# Patient Record
Sex: FEMALE | Race: BLACK OR AFRICAN AMERICAN | NOT HISPANIC OR LATINO | Employment: OTHER | URBAN - METROPOLITAN AREA
[De-identification: names, ages, dates, MRNs, and addresses within clinical notes are randomized per-mention and may not be internally consistent; named-entity substitution may affect disease eponyms.]

---

## 2023-05-01 DIAGNOSIS — I10 HYPERTENSION, UNSPECIFIED TYPE: ICD-10-CM

## 2023-05-01 RX ORDER — AMLODIPINE BESYLATE 5 MG/1
1 TABLET ORAL DAILY
COMMUNITY
Start: 2022-04-16 | End: 2023-05-01 | Stop reason: SDUPTHER

## 2023-05-01 RX ORDER — AMLODIPINE BESYLATE 5 MG/1
5 TABLET ORAL DAILY
Qty: 90 TABLET | Refills: 0 | Status: SHIPPED | OUTPATIENT
Start: 2023-05-01 | End: 2023-08-31 | Stop reason: SDUPTHER

## 2023-07-10 ENCOUNTER — OFFICE VISIT (OUTPATIENT)
Dept: PRIMARY CARE | Facility: CLINIC | Age: 73
End: 2023-07-10
Payer: COMMERCIAL

## 2023-07-10 ENCOUNTER — LAB (OUTPATIENT)
Dept: LAB | Facility: LAB | Age: 73
End: 2023-07-10
Payer: COMMERCIAL

## 2023-07-10 VITALS
HEART RATE: 77 BPM | OXYGEN SATURATION: 96 % | SYSTOLIC BLOOD PRESSURE: 123 MMHG | WEIGHT: 206 LBS | DIASTOLIC BLOOD PRESSURE: 86 MMHG | BODY MASS INDEX: 31.32 KG/M2

## 2023-07-10 DIAGNOSIS — G47.33 OBSTRUCTIVE SLEEP APNEA OF ADULT: ICD-10-CM

## 2023-07-10 DIAGNOSIS — Z11.59 NEED FOR HEPATITIS C SCREENING TEST: ICD-10-CM

## 2023-07-10 DIAGNOSIS — E78.5 DYSLIPIDEMIA: ICD-10-CM

## 2023-07-10 DIAGNOSIS — R53.83 FATIGUE, UNSPECIFIED TYPE: ICD-10-CM

## 2023-07-10 DIAGNOSIS — M17.0 PRIMARY OSTEOARTHRITIS OF BOTH KNEES: ICD-10-CM

## 2023-07-10 DIAGNOSIS — I10 PRIMARY HYPERTENSION: Primary | ICD-10-CM

## 2023-07-10 DIAGNOSIS — R73.9 HYPERGLYCEMIA: ICD-10-CM

## 2023-07-10 DIAGNOSIS — Z12.31 ENCOUNTER FOR SCREENING MAMMOGRAM FOR BREAST CANCER: ICD-10-CM

## 2023-07-10 DIAGNOSIS — E55.9 VITAMIN D DEFICIENCY: ICD-10-CM

## 2023-07-10 DIAGNOSIS — I10 PRIMARY HYPERTENSION: ICD-10-CM

## 2023-07-10 PROBLEM — J06.9 ACUTE URI: Status: RESOLVED | Noted: 2023-07-10 | Resolved: 2023-07-10

## 2023-07-10 PROBLEM — R09.81 NASAL CONGESTION: Status: RESOLVED | Noted: 2023-07-10 | Resolved: 2023-07-10

## 2023-07-10 PROBLEM — D22.9 BENIGN MOLE: Status: ACTIVE | Noted: 2023-07-10

## 2023-07-10 PROBLEM — R79.9 ABNORMAL BLOOD CHEMISTRY: Status: ACTIVE | Noted: 2023-07-10

## 2023-07-10 PROBLEM — R05.9 COUGH: Status: ACTIVE | Noted: 2023-07-10

## 2023-07-10 PROBLEM — M25.551 RIGHT HIP PAIN: Status: ACTIVE | Noted: 2023-07-10

## 2023-07-10 PROBLEM — Z20.822 EXPOSURE TO COVID-19 VIRUS: Status: RESOLVED | Noted: 2023-07-10 | Resolved: 2023-07-10

## 2023-07-10 PROBLEM — R93.89 ABNORMAL CHEST XRAY: Status: ACTIVE | Noted: 2023-07-10

## 2023-07-10 PROBLEM — K21.9 ACID REFLUX: Status: ACTIVE | Noted: 2023-07-10

## 2023-07-10 PROBLEM — M25.561 BILATERAL KNEE PAIN: Status: ACTIVE | Noted: 2023-07-10

## 2023-07-10 PROBLEM — N95.0 POSTMENOPAUSAL BLEEDING: Status: ACTIVE | Noted: 2023-07-10

## 2023-07-10 PROBLEM — R29.818 SUSPECTED SLEEP APNEA: Status: RESOLVED | Noted: 2023-07-10 | Resolved: 2023-07-10

## 2023-07-10 PROBLEM — M79.673 FOOT PAIN: Status: ACTIVE | Noted: 2023-07-10

## 2023-07-10 PROBLEM — I51.7 LVH (LEFT VENTRICULAR HYPERTROPHY): Status: ACTIVE | Noted: 2023-07-10

## 2023-07-10 PROBLEM — R79.89 ABNORMAL TSH: Status: ACTIVE | Noted: 2023-07-10

## 2023-07-10 PROBLEM — N95.1 POSTMENOPAUSAL DISORDER: Status: ACTIVE | Noted: 2023-07-10

## 2023-07-10 PROBLEM — L98.9 SKIN LESION: Status: ACTIVE | Noted: 2023-07-10

## 2023-07-10 PROBLEM — R10.9 ABDOMINAL DISCOMFORT: Status: ACTIVE | Noted: 2023-07-10

## 2023-07-10 PROBLEM — M79.89 LEFT LEG SWELLING: Status: ACTIVE | Noted: 2023-07-10

## 2023-07-10 PROBLEM — G47.10 HYPERSOMNIA: Status: ACTIVE | Noted: 2023-07-10

## 2023-07-10 PROBLEM — M25.551 GREATER TROCHANTERIC PAIN SYNDROME OF RIGHT LOWER EXTREMITY: Status: ACTIVE | Noted: 2023-07-10

## 2023-07-10 PROBLEM — M79.661 RIGHT CALF PAIN: Status: ACTIVE | Noted: 2023-07-10

## 2023-07-10 PROBLEM — R23.2 HOT FLASHES: Status: ACTIVE | Noted: 2023-07-10

## 2023-07-10 PROBLEM — R74.8 ABNORMAL LIVER ENZYMES: Status: ACTIVE | Noted: 2023-07-10

## 2023-07-10 PROBLEM — D49.7 PITUITARY TUMOR: Status: ACTIVE | Noted: 2023-07-10

## 2023-07-10 PROBLEM — E66.01 MORBID OBESITY (MULTI): Status: ACTIVE | Noted: 2023-07-10

## 2023-07-10 PROBLEM — R25.2 LEG CRAMPS: Status: ACTIVE | Noted: 2023-07-10

## 2023-07-10 PROBLEM — I42.9 CARDIOMYOPATHY (MULTI): Status: ACTIVE | Noted: 2023-07-10

## 2023-07-10 PROBLEM — R06.83 SNORING: Status: ACTIVE | Noted: 2023-07-10

## 2023-07-10 PROBLEM — M76.31 IT BAND SYNDROME, RIGHT: Status: ACTIVE | Noted: 2023-07-10

## 2023-07-10 PROBLEM — H53.9 VISION CHANGES: Status: ACTIVE | Noted: 2023-07-10

## 2023-07-10 PROBLEM — R07.89 ATYPICAL CHEST PAIN: Status: ACTIVE | Noted: 2023-07-10

## 2023-07-10 PROBLEM — M62.838 MUSCLE SPASM: Status: ACTIVE | Noted: 2023-07-10

## 2023-07-10 PROBLEM — R35.0 URINARY FREQUENCY: Status: RESOLVED | Noted: 2023-07-10 | Resolved: 2023-07-10

## 2023-07-10 PROBLEM — H91.90 CHANGE IN HEARING: Status: ACTIVE | Noted: 2023-07-10

## 2023-07-10 PROBLEM — M54.50 RIGHT LOW BACK PAIN: Status: ACTIVE | Noted: 2023-07-10

## 2023-07-10 PROBLEM — R49.0 HOARSENESS OF VOICE: Status: ACTIVE | Noted: 2023-07-10

## 2023-07-10 PROBLEM — N39.0 UTI (URINARY TRACT INFECTION): Status: RESOLVED | Noted: 2023-07-10 | Resolved: 2023-07-10

## 2023-07-10 PROBLEM — M17.11 OSTEOARTHRITIS OF RIGHT KNEE: Status: ACTIVE | Noted: 2023-07-10

## 2023-07-10 PROBLEM — M47.816 DJD (DEGENERATIVE JOINT DISEASE), LUMBAR: Status: ACTIVE | Noted: 2023-07-10

## 2023-07-10 PROBLEM — R00.2 HEART PALPITATIONS: Status: ACTIVE | Noted: 2023-07-10

## 2023-07-10 PROBLEM — I35.9 AORTIC VALVE DISORDER: Status: ACTIVE | Noted: 2023-07-10

## 2023-07-10 PROBLEM — J02.9 SORE THROAT: Status: RESOLVED | Noted: 2023-07-10 | Resolved: 2023-07-10

## 2023-07-10 PROBLEM — D21.9 FIBROIDS: Status: ACTIVE | Noted: 2023-07-10

## 2023-07-10 PROBLEM — M25.562 BILATERAL KNEE PAIN: Status: ACTIVE | Noted: 2023-07-10

## 2023-07-10 PROBLEM — M67.951 TENDINOPATHY OF RIGHT GLUTEUS MEDIUS: Status: ACTIVE | Noted: 2023-07-10

## 2023-07-10 PROBLEM — D22.9 SKIN MOLE: Status: ACTIVE | Noted: 2023-07-10

## 2023-07-10 LAB
ALANINE AMINOTRANSFERASE (SGPT) (U/L) IN SER/PLAS: 24 U/L (ref 7–45)
ALBUMIN (G/DL) IN SER/PLAS: 4.6 G/DL (ref 3.4–5)
ALKALINE PHOSPHATASE (U/L) IN SER/PLAS: 83 U/L (ref 33–136)
ANION GAP IN SER/PLAS: 12 MMOL/L (ref 10–20)
ASPARTATE AMINOTRANSFERASE (SGOT) (U/L) IN SER/PLAS: 25 U/L (ref 9–39)
BASOPHILS (10*3/UL) IN BLOOD BY AUTOMATED COUNT: 0.04 X10E9/L (ref 0–0.1)
BASOPHILS/100 LEUKOCYTES IN BLOOD BY AUTOMATED COUNT: 0.5 % (ref 0–2)
BILIRUBIN TOTAL (MG/DL) IN SER/PLAS: 0.6 MG/DL (ref 0–1.2)
CALCIDIOL (25 OH VITAMIN D3) (NG/ML) IN SER/PLAS: 25 NG/ML
CALCIUM (MG/DL) IN SER/PLAS: 10.1 MG/DL (ref 8.6–10.6)
CARBON DIOXIDE, TOTAL (MMOL/L) IN SER/PLAS: 29 MMOL/L (ref 21–32)
CHLORIDE (MMOL/L) IN SER/PLAS: 105 MMOL/L (ref 98–107)
CHOLESTEROL (MG/DL) IN SER/PLAS: 196 MG/DL (ref 0–199)
CHOLESTEROL IN HDL (MG/DL) IN SER/PLAS: 66.6 MG/DL
CHOLESTEROL/HDL RATIO: 2.9
COBALAMIN (VITAMIN B12) (PG/ML) IN SER/PLAS: 1501 PG/ML (ref 211–911)
CREATININE (MG/DL) IN SER/PLAS: 1.03 MG/DL (ref 0.5–1.05)
EOSINOPHILS (10*3/UL) IN BLOOD BY AUTOMATED COUNT: 0.23 X10E9/L (ref 0–0.4)
EOSINOPHILS/100 LEUKOCYTES IN BLOOD BY AUTOMATED COUNT: 2.6 % (ref 0–6)
ERYTHROCYTE DISTRIBUTION WIDTH (RATIO) BY AUTOMATED COUNT: 15 % (ref 11.5–14.5)
ERYTHROCYTE MEAN CORPUSCULAR HEMOGLOBIN CONCENTRATION (G/DL) BY AUTOMATED: 32 G/DL (ref 32–36)
ERYTHROCYTE MEAN CORPUSCULAR VOLUME (FL) BY AUTOMATED COUNT: 91 FL (ref 80–100)
ERYTHROCYTES (10*6/UL) IN BLOOD BY AUTOMATED COUNT: 4.78 X10E12/L (ref 4–5.2)
ESTIMATED AVERAGE GLUCOSE FOR HBA1C: 111 MG/DL
GFR FEMALE: 57 ML/MIN/1.73M2
GLUCOSE (MG/DL) IN SER/PLAS: 94 MG/DL (ref 74–99)
HEMATOCRIT (%) IN BLOOD BY AUTOMATED COUNT: 43.4 % (ref 36–46)
HEMOGLOBIN (G/DL) IN BLOOD: 13.9 G/DL (ref 12–16)
HEMOGLOBIN A1C/HEMOGLOBIN TOTAL IN BLOOD: 5.5 %
IMMATURE GRANULOCYTES/100 LEUKOCYTES IN BLOOD BY AUTOMATED COUNT: 0.2 % (ref 0–0.9)
LDL: 106 MG/DL (ref 0–99)
LEUKOCYTES (10*3/UL) IN BLOOD BY AUTOMATED COUNT: 8.8 X10E9/L (ref 4.4–11.3)
LYMPHOCYTES (10*3/UL) IN BLOOD BY AUTOMATED COUNT: 2.51 X10E9/L (ref 0.8–3)
LYMPHOCYTES/100 LEUKOCYTES IN BLOOD BY AUTOMATED COUNT: 28.7 % (ref 13–44)
MONOCYTES (10*3/UL) IN BLOOD BY AUTOMATED COUNT: 0.71 X10E9/L (ref 0.05–0.8)
MONOCYTES/100 LEUKOCYTES IN BLOOD BY AUTOMATED COUNT: 8.1 % (ref 2–10)
NEUTROPHILS (10*3/UL) IN BLOOD BY AUTOMATED COUNT: 5.24 X10E9/L (ref 1.6–5.5)
NEUTROPHILS/100 LEUKOCYTES IN BLOOD BY AUTOMATED COUNT: 59.9 % (ref 40–80)
NRBC (PER 100 WBCS) BY AUTOMATED COUNT: 0 /100 WBC (ref 0–0)
PLATELETS (10*3/UL) IN BLOOD AUTOMATED COUNT: 258 X10E9/L (ref 150–450)
POTASSIUM (MMOL/L) IN SER/PLAS: 4.5 MMOL/L (ref 3.5–5.3)
PROTEIN TOTAL: 7.6 G/DL (ref 6.4–8.2)
SODIUM (MMOL/L) IN SER/PLAS: 141 MMOL/L (ref 136–145)
THYROTROPIN (MIU/L) IN SER/PLAS BY DETECTION LIMIT <= 0.05 MIU/L: 0.75 MIU/L (ref 0.44–3.98)
TRIGLYCERIDE (MG/DL) IN SER/PLAS: 115 MG/DL (ref 0–149)
UREA NITROGEN (MG/DL) IN SER/PLAS: 17 MG/DL (ref 6–23)
VLDL: 23 MG/DL (ref 0–40)

## 2023-07-10 PROCEDURE — 1126F AMNT PAIN NOTED NONE PRSNT: CPT | Performed by: PHYSICIAN ASSISTANT

## 2023-07-10 PROCEDURE — 82306 VITAMIN D 25 HYDROXY: CPT

## 2023-07-10 PROCEDURE — 83036 HEMOGLOBIN GLYCOSYLATED A1C: CPT

## 2023-07-10 PROCEDURE — 99213 OFFICE O/P EST LOW 20 MIN: CPT | Performed by: PHYSICIAN ASSISTANT

## 2023-07-10 PROCEDURE — 85025 COMPLETE CBC W/AUTO DIFF WBC: CPT

## 2023-07-10 PROCEDURE — 1036F TOBACCO NON-USER: CPT | Performed by: PHYSICIAN ASSISTANT

## 2023-07-10 PROCEDURE — 3079F DIAST BP 80-89 MM HG: CPT | Performed by: PHYSICIAN ASSISTANT

## 2023-07-10 PROCEDURE — 86803 HEPATITIS C AB TEST: CPT

## 2023-07-10 PROCEDURE — 3074F SYST BP LT 130 MM HG: CPT | Performed by: PHYSICIAN ASSISTANT

## 2023-07-10 PROCEDURE — 82607 VITAMIN B-12: CPT

## 2023-07-10 PROCEDURE — 1159F MED LIST DOCD IN RCRD: CPT | Performed by: PHYSICIAN ASSISTANT

## 2023-07-10 PROCEDURE — 84443 ASSAY THYROID STIM HORMONE: CPT

## 2023-07-10 PROCEDURE — 36415 COLL VENOUS BLD VENIPUNCTURE: CPT

## 2023-07-10 PROCEDURE — 80061 LIPID PANEL: CPT

## 2023-07-10 PROCEDURE — 80053 COMPREHEN METABOLIC PANEL: CPT

## 2023-07-10 PROCEDURE — 1160F RVW MEDS BY RX/DR IN RCRD: CPT | Performed by: PHYSICIAN ASSISTANT

## 2023-07-10 RX ORDER — AMLODIPINE BESYLATE 5 MG/1
1 TABLET ORAL DAILY
COMMUNITY
Start: 2022-04-16 | End: 2023-07-13 | Stop reason: ALTCHOICE

## 2023-07-10 RX ORDER — NAPROXEN SODIUM 220 MG/1
1 TABLET, FILM COATED ORAL DAILY
COMMUNITY
Start: 2016-08-17

## 2023-07-10 RX ORDER — METOPROLOL SUCCINATE 100 MG/1
1 TABLET, EXTENDED RELEASE ORAL DAILY
COMMUNITY
Start: 2013-10-29 | End: 2023-10-23 | Stop reason: SDUPTHER

## 2023-07-10 RX ORDER — LISINOPRIL 40 MG/1
1 TABLET ORAL DAILY
COMMUNITY
Start: 2013-10-29 | End: 2023-07-10 | Stop reason: SDUPTHER

## 2023-07-10 RX ORDER — MELOXICAM 15 MG/1
TABLET ORAL
COMMUNITY
Start: 2023-06-26 | End: 2023-11-30

## 2023-07-10 RX ORDER — TRIAMCINOLONE ACETONIDE 32 MG
SUSPENSION,EXTENDED RELEASE VIAL (EA) INTRAARTICULAR
COMMUNITY
Start: 2023-01-17

## 2023-07-10 RX ORDER — ROSUVASTATIN CALCIUM 5 MG/1
TABLET, COATED ORAL
COMMUNITY
Start: 2020-09-15 | End: 2024-01-15 | Stop reason: SDUPTHER

## 2023-07-10 RX ORDER — LISINOPRIL 40 MG/1
40 TABLET ORAL DAILY
Qty: 90 TABLET | Refills: 1 | Status: SHIPPED | OUTPATIENT
Start: 2023-07-10 | End: 2024-01-15 | Stop reason: SDUPTHER

## 2023-07-10 RX ORDER — DEXTROMETHORPHAN HYDROBROMIDE, GUAIFENESIN 5; 100 MG/5ML; MG/5ML
LIQUID ORAL
COMMUNITY
Start: 2022-12-09

## 2023-07-10 RX ORDER — MULTIVITAMIN
1 TABLET ORAL DAILY
COMMUNITY
Start: 2020-09-15

## 2023-07-10 NOTE — ASSESSMENT & PLAN NOTE
Acceptable. Continue amlodipine 5mg, lisinopril 40mg, metoprolol succinate 100mg. Follow a strict DASH diet and exercise as tolerated.

## 2023-07-10 NOTE — PROGRESS NOTES
Subjective   Constance Crump is a 73 y.o. female who presents for 6 month f/u, fatigue .  HPI Constance Crump is a 73 y.o. female presenting for a follow up. Generally doing well. Currently c/o:    Fatigue, chronic: at her last appt she was referred to sleep medicine for WINDY as she has not been using her CPAP machine. Unfortunately she has not scheduled yet, needs a new referral. She continues to take a vitamin B12 supplement     HTN, HLD: stable on current regimen. She does check BP at home.  Denies any headache, dizziness, chest pain, SOB/KIM, palpitations, LE edema.    OA of bilateral knees: follows with orthopedics. S/p bilateral cortisone injection and completion of PT. Takes Meloxicam for pain.     Health maintenance:  Immunizations:  -Flu: deferred to fall 2023  -Pneumococcal: UTD  -Shingrix: recommended, obtain from pharmacy   -Tdap: recommended from pharmacy  Mammogram UTD (last 5/17/22) - ordered 7/10/23  Colon CA screening UTD (last 11/15/22) - 5yrs   DEXA UTD (last 5/17/22)  PAP - not indicated d/t age    Last MCR/CPE: 1/9/23 (MCR ADV)    12 point ROS reviewed and negative other than as stated in HPI    /86   Pulse 77   Wt 93.4 kg (206 lb)   SpO2 96%   BMI 31.32 kg/m²   Objective   Physical Exam  Vitals reviewed.   Constitutional:       General: She is not in acute distress.     Appearance: Normal appearance. She is not ill-appearing.   HENT:      Head: Normocephalic and atraumatic.   Eyes:      General: No scleral icterus.     Extraocular Movements: Extraocular movements intact.      Conjunctiva/sclera: Conjunctivae normal.      Pupils: Pupils are equal, round, and reactive to light.   Cardiovascular:      Rate and Rhythm: Normal rate and regular rhythm.      Heart sounds: Normal heart sounds. No murmur heard.     No friction rub. No gallop.   Pulmonary:      Effort: Pulmonary effort is normal. No respiratory distress.      Breath sounds: Normal breath sounds. No stridor. No wheezing,  rhonchi or rales.   Musculoskeletal:      Cervical back: Normal range of motion.      Right lower leg: No edema.      Left lower leg: No edema.   Skin:     General: Skin is warm and dry.   Neurological:      Mental Status: She is alert and oriented to person, place, and time. Mental status is at baseline.      Cranial Nerves: No cranial nerve deficit.      Gait: Gait normal.   Psychiatric:         Mood and Affect: Mood normal.         Behavior: Behavior normal.         Assessment/Plan   Problem List Items Addressed This Visit       Dyslipidemia     Stable.  Continue 81 mg ASA and rosuvastatin 5 mg.  Follow Mediterranean-style diet and exercise as tolerated.  Lipid panel ordered.         Relevant Orders    Lipid Panel (Completed)    Fatigue     Labs including CBC with differential, TSH with reflex free T4, and vitamin B12 ordered.  Encourage multivitamin daily, regular exercise, well-rounded diet, and adequate water intake.    Likely uncontrolled WINDY contributing.         Relevant Orders    CBC and Auto Differential (Completed)    TSH with reflex to Free T4 if abnormal (Completed)    Vitamin B12 (Completed)    Hyperglycemia     Hemoglobin A1c ordered.  Cut back on carbohydrates and sugary drinks/foods in the diet.         Relevant Orders    Hemoglobin A1C (Completed)    Obstructive sleep apnea of adult     Referral to sleep medicine placed.  Encourage CPAP use and weight loss         Relevant Orders    Referral to Adult Sleep Medicine    Primary osteoarthritis of both knees     Continue following with orthopedics.  Take meloxicam for pain.         Vitamin D deficiency     Vitamin D level ordered.         Relevant Orders    Vitamin D, Total (Completed)    Need for hepatitis C screening test     One-time Hepatitis C level ordered for screening         Relevant Orders    Hepatitis C antibody (Completed)    Hypertension - Primary     Acceptable. Continue amlodipine 5mg, lisinopril 40mg, metoprolol succinate 100mg. Follow  a strict DASH diet and exercise as tolerated.          Relevant Medications    lisinopril 40 mg tablet    Other Relevant Orders    Comprehensive Metabolic Panel (Completed)     Other Visit Diagnoses       Encounter for screening mammogram for breast cancer        Relevant Orders    BI mammo bilateral screening tomosynthesis           Follow up in 6 months for MCR wellness or sooner as needed

## 2023-07-11 LAB — HEPATITIS C VIRUS AB PRESENCE IN SERUM: NONREACTIVE

## 2023-07-13 PROBLEM — Z11.59 NEED FOR HEPATITIS C SCREENING TEST: Status: ACTIVE | Noted: 2023-07-13

## 2023-07-13 NOTE — RESULT ENCOUNTER NOTE
Lab results are back.    Vitamin B12 level was very high.  If taking any supplementation, I recommend cutting back.  Vitamin D level was low.  Take over-the-counter vitamin D3 2000 units daily with food.    Kidney function remains slightly decreased but stable from prior draws.  Drink adequate water, limit any NSAIDs.  We will continue to monitor    Lipid panel shows slightly elevated LDL (bad cholesterol).  Continue rosuvastatin 5 mg.  Work on a heart healthy lifestyle

## 2023-07-13 NOTE — ASSESSMENT & PLAN NOTE
Stable.  Continue 81 mg ASA and rosuvastatin 5 mg.  Follow Mediterranean-style diet and exercise as tolerated.  Lipid panel ordered.

## 2023-07-13 NOTE — ASSESSMENT & PLAN NOTE
Labs including CBC with differential, TSH with reflex free T4, and vitamin B12 ordered.  Encourage multivitamin daily, regular exercise, well-rounded diet, and adequate water intake.    Likely uncontrolled WINDY contributing.

## 2023-07-26 DIAGNOSIS — E55.9 VITAMIN D DEFICIENCY: Primary | ICD-10-CM

## 2023-07-26 RX ORDER — ACETAMINOPHEN 500 MG
50 TABLET ORAL
Qty: 30 CAPSULE | Refills: 11 | Status: SHIPPED | OUTPATIENT
Start: 2023-07-26 | End: 2023-08-21

## 2023-08-18 DIAGNOSIS — E55.9 VITAMIN D DEFICIENCY: ICD-10-CM

## 2023-08-18 DIAGNOSIS — I10 HYPERTENSION, UNSPECIFIED TYPE: Primary | ICD-10-CM

## 2023-08-21 RX ORDER — NICOTINE 11MG/24HR
PATCH, TRANSDERMAL 24 HOURS TRANSDERMAL
Qty: 30 CAPSULE | Refills: 5 | Status: SHIPPED | OUTPATIENT
Start: 2023-08-21 | End: 2024-02-27

## 2023-08-31 RX ORDER — AMLODIPINE BESYLATE 5 MG/1
5 TABLET ORAL DAILY
Qty: 90 TABLET | Refills: 1 | Status: SHIPPED | OUTPATIENT
Start: 2023-08-31 | End: 2024-01-15 | Stop reason: SDUPTHER

## 2023-10-23 DIAGNOSIS — R00.2 HEART PALPITATIONS: Primary | ICD-10-CM

## 2023-10-23 RX ORDER — METOPROLOL SUCCINATE 100 MG/1
100 TABLET, EXTENDED RELEASE ORAL DAILY
Qty: 90 TABLET | Refills: 3 | Status: SHIPPED | OUTPATIENT
Start: 2023-10-23 | End: 2024-01-15 | Stop reason: SDUPTHER

## 2023-10-24 PROBLEM — B07.8 OTHER VIRAL WARTS: Status: ACTIVE | Noted: 2020-06-16

## 2023-10-24 PROBLEM — L82.1 DERMATOSIS PAPULOSA NIGRA: Status: ACTIVE | Noted: 2020-06-16

## 2023-10-24 PROBLEM — L82.1 OTHER SEBORRHEIC KERATOSIS: Status: ACTIVE | Noted: 2020-06-16

## 2024-01-08 ENCOUNTER — OFFICE VISIT (OUTPATIENT)
Dept: ORTHOPEDIC SURGERY | Facility: CLINIC | Age: 74
End: 2024-01-08
Payer: MEDICARE

## 2024-01-08 DIAGNOSIS — M25.551 RIGHT HIP PAIN: ICD-10-CM

## 2024-01-08 DIAGNOSIS — M70.61 TROCHANTERIC BURSITIS OF RIGHT HIP: Primary | ICD-10-CM

## 2024-01-08 DIAGNOSIS — M17.0 PRIMARY OSTEOARTHRITIS OF BOTH KNEES: ICD-10-CM

## 2024-01-08 PROCEDURE — 1126F AMNT PAIN NOTED NONE PRSNT: CPT | Performed by: STUDENT IN AN ORGANIZED HEALTH CARE EDUCATION/TRAINING PROGRAM

## 2024-01-08 PROCEDURE — 20611 DRAIN/INJ JOINT/BURSA W/US: CPT | Performed by: STUDENT IN AN ORGANIZED HEALTH CARE EDUCATION/TRAINING PROGRAM

## 2024-01-08 PROCEDURE — 1036F TOBACCO NON-USER: CPT | Performed by: STUDENT IN AN ORGANIZED HEALTH CARE EDUCATION/TRAINING PROGRAM

## 2024-01-08 PROCEDURE — 99213 OFFICE O/P EST LOW 20 MIN: CPT | Performed by: STUDENT IN AN ORGANIZED HEALTH CARE EDUCATION/TRAINING PROGRAM

## 2024-01-08 RX ORDER — ROPIVACAINE HYDROCHLORIDE 5 MG/ML
3 INJECTION, SOLUTION EPIDURAL; INFILTRATION; PERINEURAL
Status: COMPLETED | OUTPATIENT
Start: 2024-01-08 | End: 2024-01-08

## 2024-01-08 RX ORDER — METHYLPREDNISOLONE ACETATE 40 MG/ML
40 INJECTION, SUSPENSION INTRA-ARTICULAR; INTRALESIONAL; INTRAMUSCULAR; SOFT TISSUE
Status: COMPLETED | OUTPATIENT
Start: 2024-01-08 | End: 2024-01-08

## 2024-01-08 RX ORDER — LIDOCAINE HYDROCHLORIDE 10 MG/ML
2 INJECTION, SOLUTION EPIDURAL; INFILTRATION; INTRACAUDAL; PERINEURAL
Status: COMPLETED | OUTPATIENT
Start: 2024-01-08 | End: 2024-01-08

## 2024-01-08 RX ADMIN — METHYLPREDNISOLONE ACETATE 40 MG: 40 INJECTION, SUSPENSION INTRA-ARTICULAR; INTRALESIONAL; INTRAMUSCULAR; SOFT TISSUE at 11:45

## 2024-01-08 RX ADMIN — ROPIVACAINE HYDROCHLORIDE 3 ML: 5 INJECTION, SOLUTION EPIDURAL; INFILTRATION; PERINEURAL at 11:45

## 2024-01-08 RX ADMIN — LIDOCAINE HYDROCHLORIDE 2 ML: 10 INJECTION, SOLUTION EPIDURAL; INFILTRATION; INTRACAUDAL; PERINEURAL at 11:45

## 2024-01-08 NOTE — PROGRESS NOTES
REFERRAL SOURCE: No ref. provider found     CHIEF COMPLAINT: right hip pain    HISTORY OF PRESENT ILLNESS  Constance Crmup is a very pleasant 73 y.o. female with history of obesity (BMI over 30), cardiomyopathy, hyperlipidemia, hyperglycemia, hypertension, elevated liver enzymes who presents for follow-up of right hip pain.     Previous history:  7/14/2022: She is a former patient of Dr. Garcia. She has had bilateral knee pain for many years. Pain is diffusely over the anterior knee, but worse laterally. Currently, the right knee is worse than the left. She denies give way or locking. She has not had any falls. She is typically getting corticosteroid injections, but they started only last 2 months. She has not done physical therapy recently. 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      She underwent bilateral knee Zilretta injections on 7/14/2022.     10/13/2022: She reports excellent pain relief after the bilateral knee Zilretta injections. However, about 2 weeks ago (2.5 months of relief), she started having worsening pain in her right knee with increased swelling. She also gets pain in the right lateral thigh, especially at night when she is lying on her left side. Pain is worse with any activity and improved with rest. Currently, her left knee is doing well and it is only the right side that is giving her trouble. She denies pain that radiates all the way down the leg. No numbness or tingling in the leg or foot.     She underwent a right knee corticosteroid injection on 10/13/2022.     12/15/2022: She only recently was able to enroll in physical therapy. She notes that her left knee is still feeling fairly well, but she will get occasional twinges of pain in the anterior medial knee. She is most bothered by her right lateral knee and lateral thigh as well as lateral hip that is extremely painful, particularly when standing still. She does not have any pain or numbness or tingling  that radiates all the way down the leg. She is working on IT band stretching and glutes strengthening with physical therapy.     She underwent a right gluteus medius tendon sheath injection on 12/15/2022.     2/15/2023: She notes that her hip pain has significantly improved after the injection. We were not able to get the Zilretta injections approved for her bilateral knees. She feels like the bilateral knee pain is worse than it has been in a long time. Pain is located primarily anterior laterally and is worse with activity and weightbearing and improves but does not completely resolve with sitting and rest. She has been previously doing physical therapy, but went out of town and no longer remembers her home exercises.     She underwent bilateral knee Depo medrol injections on 2/15/2022.     4/5/23: Today she complains primarily of right hip pain. She also has bilateral knee pain and is waiting for approval for the Zilretta injections. Her right lateral hip pain returned a few weeks ago. She notes that it is worse with changes in the weather and worse if her knees are hurting more. She did do physical therapy in the past, but stopped early because she did not have time. She still doing some of her home exercise program.     5/17/23: Complains of bilateral lateral knee pain continue to gradually worsen. Pain is worse on the right than the left and she describes as an ache. She did go on a trip to [place], which slightly exacerbated her symptoms.     Interval history:  1/8/24: She reports that her right lateral hip pain has returned.  Previously attended physical therapy and was doing home exercises, but she has stopped her home exercise program and no longer remembers the exercises.  She also has some pain in the bilateral knees, though the lateral hip is worse.  The hip bothers her when she is up walking around but also at night when sleeping.    MEDS    Current Outpatient Medications:     acetaminophen (Tylenol  Arthritis Pain) 650 mg ER tablet, Take by mouth., Disp: , Rfl:     amLODIPine (Norvasc) 5 mg tablet, Take 1 tablet (5 mg) by mouth once daily., Disp: 90 tablet, Rfl: 1    aspirin 81 mg chewable tablet, Chew 1 tablet (81 mg) once daily., Disp: , Rfl:     cholecalciferol (Vitamin D3) 50 mcg (2,000 unit) capsule, TAKE 1 CAPSULE (50 MCG) BY MOUTH ONCE DAILY WITH A MEAL., Disp: 30 capsule, Rfl: 5    lisinopril 40 mg tablet, Take 1 tablet (40 mg) by mouth once daily., Disp: 90 tablet, Rfl: 1    meloxicam (Mobic) 7.5 mg tablet, TAKE 1 TABLET BY MOUTH EVERYDAY AT BEDTIME, Disp: 30 tablet, Rfl: 3    metoprolol succinate XL (Toprol-XL) 100 mg 24 hr tablet, Take 1 tablet (100 mg) by mouth once daily., Disp: 90 tablet, Rfl: 3    multivitamin with folic acid (One Daily Multivitamin) 400 mcg tablet, Take 1 tablet by mouth once daily., Disp: , Rfl:     rosuvastatin (Crestor) 5 mg tablet, Take by mouth., Disp: , Rfl:     triamcinolone acetonide (Zilretta) 32 mg injection, Inject into the joint., Disp: , Rfl:     ALLERGIES  No Known Allergies    PAST MEDICAL HISTORY  Past Medical History:   Diagnosis Date    Benign neoplasm of pituitary gland (CMS/HCC)     Pituitary adenoma    Cardiomegaly 10/30/2013    Left ventricular hypertrophy    Exposure to COVID-19 virus 07/10/2023    Nasal congestion 07/10/2023    Personal history of other endocrine, nutritional and metabolic disease     History of goiter       PAST SURGICAL HISTORY  Past Surgical History:   Procedure Laterality Date    CHOLECYSTECTOMY  09/29/2014    Cholecystectomy    MOUTH SURGERY  09/11/2018    Oral Surgery Tooth Extraction    OTHER SURGICAL HISTORY  02/11/2016    Transsphenoidal Tumor Removal    OTHER SURGICAL HISTORY  04/22/2015    Thyroid Surgery Sub-Total Thyroidectomy       SOCIAL HISTORY   Social History     Socioeconomic History    Marital status:      Spouse name: Not on file    Number of children: Not on file    Years of education: Not on file    Highest  education level: Not on file   Occupational History    Not on file   Tobacco Use    Smoking status: Never    Smokeless tobacco: Never   Substance and Sexual Activity    Alcohol use: Not on file    Drug use: Not on file    Sexual activity: Not on file   Other Topics Concern    Not on file   Social History Narrative    Not on file     Social Determinants of Health     Financial Resource Strain: Not on file   Food Insecurity: Not on file   Transportation Needs: Not on file   Physical Activity: Not on file   Stress: Not on file   Social Connections: Not on file   Intimate Partner Violence: Not on file   Housing Stability: Not on file       FAMILY HISTORY  Family History   Problem Relation Name Age of Onset    Other (brain tumor) Mother      Endometrial cancer Mother      Stroke Mother          CVA    Diabetes Mother      Hypertension Mother      Breast cancer Mother      Ovarian cancer Mother      Colon cancer Father      Endometrial cancer Father      Coronary artery disease Father      Breast cancer Father      Ovarian cancer Father      Hypertension Sister      Diabetes Brother      Hypertension Brother      Diabetes Mother's Sister      Colon cancer Maternal Grandmother      Diabetes Maternal Grandmother      Stroke Maternal Grandfather          CVA    Colon cancer Paternal Grandfather      Endometrial cancer Other multiple family members     Breast cancer Other multiple family members     Colon cancer Other multiple family members     Colon cancer Cousin maternal        REVIEW OF SYSTEMS  Except for those mentioned in the history of present illness, and below, a complete review of systems is negative.     Review of Systems    VITALS  There were no vitals filed for this visit.    PHYSICAL EXAMINATION   GENERAL:  Awake, alert, and oriented, no apparent distress, pleasant, and cooperative  PSYC: Mood is euthymic, affect is congruent  EAR, NOSE, THROAT:  Normocephalic, atraumatic, moist membranes, anicteric  sclera  LUNG: Nonlabored breathing  HEART: No clubbing or cyanosis  SKIN: No increased erythema, warmth, rashes, or concerning skin lesions  NEURO: Sensation is intact in the bilateral lower extremities. Strength is grossly 5 out of 5 throughout the bilateral lower extremities, unless noted below.  GAIT: Non-antalgic  MUSCULOSKELETAL: Examination of the right hip: Hip range of motion was full and pain-free. Scour's and FAIR were negative. Stinchfield was negative. Kusum's was negative. Ganeslen's and P4 are negative.  Tender palpation of the greater trochanter.  Prema's was positive.  Pain and weakness with resisted hip abduction and external rotation.    IMAGING STUDIES:   Radiographs of the right hip dated 10/25/2021 were personally reviewed and interpreted by me, Dr. Rafaela Neri, and the findings shared with the patient.  Mild bilateral hip osteoarthrosis.  Enthesophytes at the bilateral greater trochanter.  Degenerative change in the lower lumbar spine.    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 female with history of obesity (BMI over 30), cardiomyopathy, hyperlipidemia, hyperglycemia, hypertension, elevated liver enzymes who presents for follow-up of right hip pain due to use medius tendinosis and greater trochanteric bursitis.  She does have underlying right hip arthrosis on radiographs, though this does not appear to be symptomatic for her today.  -We discussed the above.  -She had an injection in into the right gluteus medius tendon sheath/greater trochanteric bursa in December 2022 and would like to have a repeat.  We discussed the pros, cons, risks, benefits and she wished to proceed.  Please see procedure note section for complete details.  -We also discussed the importance of her physical therapy exercises and she would like to return to physical therapy  for refresher session, as it has been very helpful in the past.  She was given a new prescription today.  -She does also have bilateral knee pain and would like to have the bilateral knees injected.  We will get her scheduled for a follow-up appointment to have this performed.    The patient was counseled to remain active, but avoid activities that worsen symptoms. The patient was in agreement with this plan. All questions were answered to the best of my ability.    PATIENT EDUCATION:  Education was discussed at today's appointment. A learning needs assessment was performed.    Primary learner: Constance CARROLL Theron  Barriers to learning: None  Preferred language: English  Learning preferences include: Seeing and doing.  Discussed: Diagnosis and treatment plan.  Demonstrated: Understanding of material discussed.  Patient education materials given: None.  Learner response: Learner demonstrated understanding.    This note was dictated using Dragon speech recognition software and was not corrected for spelling or grammatical errors.    Large Jt Asp/Inj: R greater trochanteric bursa on 1/8/2024 11:45 AM  Details: ultrasound-guided  Medications: 2 mL lidocaine PF 10 mg/mL (1 %); 3 mL ropivacaine; 40 mg methylPREDNISolone acetate 40 mg/mL    Pre-Procedure Diagnosis: right lateral hip pain, right gluteus medius tendinopathy and greater trochanteric bursopathy  Post-Procedure Diagnosis: right lateral hip pain, right gluteus medius tendinopathy and greater trochanteric bursopathy    Procedure: US-guided gluteus medius tendon sheath and trochanteric bursa corticosteroid injection    History of Present Illness: Constance Crump is a pleasant 73 y.o. female with lateral hip pain due to gluteus medius tendinopathy and greater trochanteric bursopathy who is here for the above procedure for diagnostic purposes and improved pain control.      Medications and allergies were reviewed with the patient. No contraindications were  identified.    Informed Consent  Following denial of allergy and review of potential side effects and complications including but not necessarily limited to infection, allergic reaction, local tissue breakdown, injury to soft tissue and/or nerves and seizure, the patient indicated understanding and agreed to proceed. Written consent to treatment was obtained and the patient verbalized consent for the procedure.    Procedural Details  The use of direct ultrasound visualization of the needle (rather than a non-guided injection) was required to increase patient safety by excluding inadvertent intramuscular or intratendinous placement and minimizing bleeding by avoiding osteochondral or vascular injury from the needle.  Additionally, the increased accuracy of placement may increase clinical effectiveness and will allow higher diagnostic specificity when evaluating effectiveness of this injection.    Using ultrasound, a pre-scan of the region was performed to identify the target structure.     The area was prepped with chlorhexidine, then re-examined using the same transducer, a sterile ultrasound transducer cover, and sterile ultrasound gel.    Procedural pause conducted to verify: Correct patient identity, procedure to be performed, and as applicable, correct side and site, correct patient position, and availability of implants, special equipment, or special requirements.    Procedure:   Transducer: Linear array transducer.  Patient position: Lateral decubitus with the affected lateral hip facing the ceiling.  Localization process: With the transducer in an anatomic transverse plane over the greater trochanter, the trochanteric bursa was localized in a long axis view superficial to the gluteus medius tendon and deep to the gluteus brandi muscle/ITB.  Local anesthesia: Local anesthesia was obtained with 2 cc of 1% lidocaine.  Needle: A 25-gauge, 3.5-inch needle was used for local anesthesia and for the  injectate.  Approach: A posterior to anterior, in plane, approach was used to guide the needle tip into the gluteus medius tendon sheath and subsequently the trochanteric bursa.  Injection/Aspiration: A mixture of 2 cc of 0.5% ropivocaine and 1 cc of DepoMedrol (40mg/mL) was injected into the right gluteus medius tendon sheath and greater trochanteric bursa without complication.  Good flow was observed within the bursa.    Post-procedural care: The patient tolerated the procedure well and reported complete pain relief during the anesthetic phase. The patient was asked to ice for improved pain control and avoid submerging the area in water for the next 48 hours to help reduce the risk of infection. The patient was instructed to call the office immediately if there are any questions or concerns.     PATIENT EDUCATION:  Education of the diagnosis and treatment plan was discussed at today's appointment. A learning needs assessment was performed. The patient demonstrated understanding.

## 2024-01-15 ENCOUNTER — OFFICE VISIT (OUTPATIENT)
Dept: PRIMARY CARE | Facility: CLINIC | Age: 74
End: 2024-01-15
Payer: MEDICARE

## 2024-01-15 VITALS
WEIGHT: 205 LBS | DIASTOLIC BLOOD PRESSURE: 74 MMHG | HEART RATE: 72 BPM | OXYGEN SATURATION: 98 % | SYSTOLIC BLOOD PRESSURE: 108 MMHG | BODY MASS INDEX: 31.17 KG/M2

## 2024-01-15 DIAGNOSIS — Z00.00 MEDICARE ANNUAL WELLNESS VISIT, SUBSEQUENT: Primary | ICD-10-CM

## 2024-01-15 DIAGNOSIS — Z13.89 ENCOUNTER FOR SCREENING FOR OTHER DISORDER: ICD-10-CM

## 2024-01-15 DIAGNOSIS — L98.9 SKIN LESION: ICD-10-CM

## 2024-01-15 DIAGNOSIS — E78.5 HYPERLIPIDEMIA, UNSPECIFIED HYPERLIPIDEMIA TYPE: ICD-10-CM

## 2024-01-15 DIAGNOSIS — I10 PRIMARY HYPERTENSION: ICD-10-CM

## 2024-01-15 DIAGNOSIS — I10 HYPERTENSION, UNSPECIFIED TYPE: ICD-10-CM

## 2024-01-15 PROBLEM — M76.822 POSTERIOR TIBIAL TENDON DYSFUNCTION (PTTD) OF LEFT LOWER EXTREMITY: Status: ACTIVE | Noted: 2017-11-07

## 2024-01-15 PROCEDURE — 1126F AMNT PAIN NOTED NONE PRSNT: CPT | Performed by: PHYSICIAN ASSISTANT

## 2024-01-15 PROCEDURE — 3074F SYST BP LT 130 MM HG: CPT | Performed by: PHYSICIAN ASSISTANT

## 2024-01-15 PROCEDURE — G0439 PPPS, SUBSEQ VISIT: HCPCS | Performed by: PHYSICIAN ASSISTANT

## 2024-01-15 PROCEDURE — 1124F ACP DISCUSS-NO DSCNMKR DOCD: CPT | Performed by: PHYSICIAN ASSISTANT

## 2024-01-15 PROCEDURE — 1033F TOBACCO NONSMOKER NOR 2NDHND: CPT | Performed by: PHYSICIAN ASSISTANT

## 2024-01-15 PROCEDURE — 3078F DIAST BP <80 MM HG: CPT | Performed by: PHYSICIAN ASSISTANT

## 2024-01-15 PROCEDURE — 90662 IIV NO PRSV INCREASED AG IM: CPT | Performed by: PHYSICIAN ASSISTANT

## 2024-01-15 PROCEDURE — 99397 PER PM REEVAL EST PAT 65+ YR: CPT | Performed by: PHYSICIAN ASSISTANT

## 2024-01-15 PROCEDURE — 1160F RVW MEDS BY RX/DR IN RCRD: CPT | Performed by: PHYSICIAN ASSISTANT

## 2024-01-15 PROCEDURE — 1170F FXNL STATUS ASSESSED: CPT | Performed by: PHYSICIAN ASSISTANT

## 2024-01-15 PROCEDURE — 1159F MED LIST DOCD IN RCRD: CPT | Performed by: PHYSICIAN ASSISTANT

## 2024-01-15 PROCEDURE — G0008 ADMIN INFLUENZA VIRUS VAC: HCPCS | Performed by: PHYSICIAN ASSISTANT

## 2024-01-15 PROCEDURE — 1036F TOBACCO NON-USER: CPT | Performed by: PHYSICIAN ASSISTANT

## 2024-01-15 RX ORDER — LISINOPRIL 40 MG/1
40 TABLET ORAL DAILY
Qty: 90 TABLET | Refills: 1 | Status: SHIPPED | OUTPATIENT
Start: 2024-01-15 | End: 2024-02-14 | Stop reason: SDUPTHER

## 2024-01-15 RX ORDER — AMLODIPINE BESYLATE 5 MG/1
5 TABLET ORAL DAILY
Qty: 90 TABLET | Refills: 1 | Status: SHIPPED | OUTPATIENT
Start: 2024-01-15

## 2024-01-15 RX ORDER — METOPROLOL SUCCINATE 100 MG/1
100 TABLET, EXTENDED RELEASE ORAL DAILY
Qty: 90 TABLET | Refills: 1 | Status: SHIPPED | OUTPATIENT
Start: 2024-01-15

## 2024-01-15 RX ORDER — ROSUVASTATIN CALCIUM 5 MG/1
5 TABLET, COATED ORAL DAILY
Qty: 90 TABLET | Refills: 1 | Status: SHIPPED | OUTPATIENT
Start: 2024-01-15 | End: 2024-02-29

## 2024-01-15 ASSESSMENT — ENCOUNTER SYMPTOMS
LOSS OF SENSATION IN FEET: 0
OCCASIONAL FEELINGS OF UNSTEADINESS: 0
DEPRESSION: 0

## 2024-01-15 ASSESSMENT — ACTIVITIES OF DAILY LIVING (ADL)
DRESSING: INDEPENDENT
DOING_HOUSEWORK: INDEPENDENT
BATHING: INDEPENDENT
TAKING_MEDICATION: INDEPENDENT
MANAGING_FINANCES: INDEPENDENT
GROCERY_SHOPPING: INDEPENDENT

## 2024-01-15 ASSESSMENT — PATIENT HEALTH QUESTIONNAIRE - PHQ9
2. FEELING DOWN, DEPRESSED OR HOPELESS: NOT AT ALL
1. LITTLE INTEREST OR PLEASURE IN DOING THINGS: NOT AT ALL
SUM OF ALL RESPONSES TO PHQ9 QUESTIONS 1 AND 2: 0

## 2024-01-15 NOTE — PROGRESS NOTES
Subjective   Constance Crump is a 73 y.o. female who presents for a Medicare Annual Wellness exam. Generally doing well. No new complaints.     HTN, HLD: stable on amlodipine 5mg, lisinopril 40, and metoprolol succinate 100mg. She does check BP at home.  Also taking 81 mg ASA and rosuvastatin 5 mg.  Denies any headache, dizziness, chest pain, SOB/KIM, palpitations, LE edema. She is currently doing a 21 day fast. She is allowed to eat fruits, veggies, and whole grains. Drinking water and 1 glass of fruit juice per day.     OA of bilateral knees: follows with orthoedics. S/p bilateral cortisone injections and completion of PT. Takes Meloxicam for pain.     R trochanteric bursitis: Received a cortisone injection with orthopedics and has since been feeling well    Health maintenance:  Immunizations:  -Flu: obtain high-dose today 1/15/24  -Pneumococcal: UTD  -Shingrix: recommended, obtain from pharmacy   -Tdap: recommended from pharmacy  Mammogram UTD (last 7/21/23)  Colon CA screening UTD (last 11/15/22) - 5yrs   DEXA UTD (last 5/17/22)  PAP - not indicated d/t age  Eye care: due, will schedule  Dental care: due, will schedule  No healthcare POA/living will     Last MCR/CPE: 1/15/24 (MCR ADV)    12 point ROS reviewed and negative other than as stated in HPI    Past Medical, Surgical, and Family History reviewed and updated in chart.    Reviewed all medications by prescribing practitioner or clinical pharmacist (such as prescriptions, OTCs, herbal therapies and supplements) and documented in the medical record.    /74   Pulse 72   Wt 93 kg (205 lb)   SpO2 98%   BMI 31.17 kg/m²   Objective   Physical Exam  Vitals reviewed.   Constitutional:       General: She is not in acute distress.     Appearance: Normal appearance.   HENT:      Head: Normocephalic and atraumatic.      Right Ear: Tympanic membrane, ear canal and external ear normal. There is no impacted cerumen.      Left Ear: Tympanic membrane, ear canal  and external ear normal. There is no impacted cerumen.      Nose: Nose normal. No congestion or rhinorrhea.      Mouth/Throat:      Mouth: Mucous membranes are moist.      Pharynx: Oropharynx is clear. No oropharyngeal exudate or posterior oropharyngeal erythema.   Eyes:      General: No scleral icterus.        Right eye: No discharge.         Left eye: No discharge.      Extraocular Movements: Extraocular movements intact.      Conjunctiva/sclera: Conjunctivae normal.      Pupils: Pupils are equal, round, and reactive to light.   Cardiovascular:      Rate and Rhythm: Normal rate and regular rhythm.      Heart sounds: Normal heart sounds. No murmur heard.     No friction rub. No gallop.   Pulmonary:      Effort: Pulmonary effort is normal. No respiratory distress.      Breath sounds: Normal breath sounds. No stridor. No wheezing, rhonchi or rales.   Abdominal:      General: Bowel sounds are normal. There is no distension.      Palpations: Abdomen is soft. There is no mass.      Tenderness: There is no abdominal tenderness. There is no right CVA tenderness or left CVA tenderness.   Musculoskeletal:         General: Normal range of motion.      Cervical back: Normal range of motion and neck supple.      Right lower leg: No edema.      Left lower leg: No edema.   Skin:     General: Skin is warm and dry.      Findings: No rash.      Comments: Skin tag versus seborrheic keratosis behind the right ear   Neurological:      General: No focal deficit present.      Mental Status: She is alert and oriented to person, place, and time. Mental status is at baseline.      Cranial Nerves: No cranial nerve deficit.      Gait: Gait normal.   Psychiatric:         Mood and Affect: Mood normal.         Behavior: Behavior normal.         Assessment/Plan   Problem List Items Addressed This Visit       Hyperlipidemia     Stable.  Continue 81 mg ASA and rosuvastatin 5 mg.  Follow Mediterranean-style diet and exercise as tolerated.  Lipid  panel ordered.         Relevant Medications    rosuvastatin (Crestor) 5 mg tablet    Other Relevant Orders    Lipid panel (Completed)    Hypertension     Acceptable. Continue amlodipine 5mg, lisinopril 40mg, metoprolol succinate 100mg. Follow a strict DASH diet and exercise as tolerated.          Relevant Medications    amLODIPine (Norvasc) 5 mg tablet    lisinopril 40 mg tablet    metoprolol succinate XL (Toprol-XL) 100 mg 24 hr tablet    Other Relevant Orders    Comprehensive metabolic panel (Completed)    Skin lesion     Referral to dermatology placed         Relevant Orders    Referral to Dermatology    Medicare annual wellness visit, subsequent - Primary     Discussed age-appropriate preventative health measures.  CPE labs ordered          Other Visit Diagnoses       Encounter for screening for other disorder              Follow-up in 6 months with Dr. Randle or sooner as needed

## 2024-01-17 ENCOUNTER — LAB (OUTPATIENT)
Dept: LAB | Facility: LAB | Age: 74
End: 2024-01-17
Payer: MEDICARE

## 2024-01-17 DIAGNOSIS — I10 PRIMARY HYPERTENSION: ICD-10-CM

## 2024-01-17 DIAGNOSIS — E78.5 HYPERLIPIDEMIA, UNSPECIFIED HYPERLIPIDEMIA TYPE: ICD-10-CM

## 2024-01-17 LAB
ALBUMIN SERPL BCP-MCNC: 4.4 G/DL (ref 3.4–5)
ALP SERPL-CCNC: 86 U/L (ref 33–136)
ALT SERPL W P-5'-P-CCNC: 27 U/L (ref 7–45)
ANION GAP SERPL CALC-SCNC: 14 MMOL/L (ref 10–20)
AST SERPL W P-5'-P-CCNC: 21 U/L (ref 9–39)
BILIRUB SERPL-MCNC: 0.7 MG/DL (ref 0–1.2)
BUN SERPL-MCNC: 21 MG/DL (ref 6–23)
CALCIUM SERPL-MCNC: 9.9 MG/DL (ref 8.6–10.6)
CHLORIDE SERPL-SCNC: 103 MMOL/L (ref 98–107)
CHOLEST SERPL-MCNC: 157 MG/DL (ref 0–199)
CHOLESTEROL/HDL RATIO: 2.7
CO2 SERPL-SCNC: 28 MMOL/L (ref 21–32)
CREAT SERPL-MCNC: 1.08 MG/DL (ref 0.5–1.05)
EGFRCR SERPLBLD CKD-EPI 2021: 54 ML/MIN/1.73M*2
GLUCOSE SERPL-MCNC: 90 MG/DL (ref 74–99)
HDLC SERPL-MCNC: 58.8 MG/DL
LDLC SERPL CALC-MCNC: 79 MG/DL
NON HDL CHOLESTEROL: 98 MG/DL (ref 0–149)
POTASSIUM SERPL-SCNC: 4.9 MMOL/L (ref 3.5–5.3)
PROT SERPL-MCNC: 7.6 G/DL (ref 6.4–8.2)
SODIUM SERPL-SCNC: 140 MMOL/L (ref 136–145)
TRIGL SERPL-MCNC: 95 MG/DL (ref 0–149)
VLDL: 19 MG/DL (ref 0–40)

## 2024-01-17 PROCEDURE — 80053 COMPREHEN METABOLIC PANEL: CPT

## 2024-01-17 PROCEDURE — 80061 LIPID PANEL: CPT

## 2024-01-17 PROCEDURE — 36415 COLL VENOUS BLD VENIPUNCTURE: CPT

## 2024-01-23 ENCOUNTER — OFFICE VISIT (OUTPATIENT)
Dept: ORTHOPEDIC SURGERY | Facility: CLINIC | Age: 74
End: 2024-01-23

## 2024-01-23 DIAGNOSIS — M54.50 LUMBAR BACK PAIN: ICD-10-CM

## 2024-01-23 DIAGNOSIS — M17.0 PRIMARY OSTEOARTHRITIS OF BOTH KNEES: Primary | ICD-10-CM

## 2024-01-23 PROCEDURE — 1036F TOBACCO NON-USER: CPT | Performed by: FAMILY MEDICINE

## 2024-01-23 PROCEDURE — 1159F MED LIST DOCD IN RCRD: CPT | Performed by: FAMILY MEDICINE

## 2024-01-23 PROCEDURE — 1126F AMNT PAIN NOTED NONE PRSNT: CPT | Performed by: FAMILY MEDICINE

## 2024-01-23 PROCEDURE — 20611 DRAIN/INJ JOINT/BURSA W/US: CPT | Performed by: FAMILY MEDICINE

## 2024-01-23 PROCEDURE — 1160F RVW MEDS BY RX/DR IN RCRD: CPT | Performed by: FAMILY MEDICINE

## 2024-01-23 PROCEDURE — 1157F ADVNC CARE PLAN IN RCRD: CPT | Performed by: FAMILY MEDICINE

## 2024-01-23 PROCEDURE — 99214 OFFICE O/P EST MOD 30 MIN: CPT | Performed by: FAMILY MEDICINE

## 2024-01-23 RX ORDER — TRIAMCINOLONE ACETONIDE 40 MG/ML
40 INJECTION, SUSPENSION INTRA-ARTICULAR; INTRAMUSCULAR
Status: COMPLETED | OUTPATIENT
Start: 2024-01-23 | End: 2024-01-23

## 2024-01-23 RX ORDER — MELOXICAM 15 MG/1
15 TABLET ORAL DAILY
Qty: 30 TABLET | Refills: 2 | Status: SHIPPED | OUTPATIENT
Start: 2024-01-23 | End: 2024-01-23 | Stop reason: SDUPTHER

## 2024-01-23 RX ORDER — LIDOCAINE HYDROCHLORIDE 10 MG/ML
4 INJECTION INFILTRATION; PERINEURAL
Status: COMPLETED | OUTPATIENT
Start: 2024-01-23 | End: 2024-01-23

## 2024-01-23 RX ORDER — MELOXICAM 15 MG/1
15 TABLET ORAL DAILY PRN
Qty: 30 TABLET | Refills: 1 | Status: SHIPPED | OUTPATIENT
Start: 2024-01-23 | End: 2025-01-22

## 2024-01-23 RX ADMIN — TRIAMCINOLONE ACETONIDE 40 MG: 40 INJECTION, SUSPENSION INTRA-ARTICULAR; INTRAMUSCULAR at 13:38

## 2024-01-23 RX ADMIN — LIDOCAINE HYDROCHLORIDE 4 ML: 10 INJECTION INFILTRATION; PERINEURAL at 13:38

## 2024-01-23 NOTE — PROGRESS NOTES
** Please excuse any errors in grammar or translation related to this dictation. Voice recognition software was utilized to prepare this document. **    Assessment & Plan:  Patient here for ongoing management of bilateral knee arthritis. Patient did inquire about knee replacement surgery.  Discussed with patient that while she does have advanced degenerative changes, if she feels that her symptoms are well-managed with repeat steroid injections and the occasional use of oral meloxicam can continue this for the time being.  If she feels her quality of life is diminished due to pain related arthritis at that time would recommend she speak with a surgeon about arthroplasty.  For the time being she plans to continue nonoperative measures.  Given previous positive response to steroid injection, offered to repeat today which patient was agreeable to have completed.  Can have this repeated in 3 or more months as dictated by symptoms.  For intermittent symptoms can continue use of meloxicam.  New prescription for meloxicam 15mg was ordered as it was more helpful than 7.5mg dose.  Patient was advised to use his medication judiciously due to her reduced GFR (ideally should not take more than 7 days and then stop for 3-4 days) and also informed to speak with her PCP about this.  Can also use acetaminophen, ice pack or heating pad.   All questions answered and patient agrees to this plan.     In regards to her back pain, patient was referred to see one of our ortho spine team members    Chief complaint:  Bilateral knee pain    HPI:  74 y/o patient, hx of HTN and bilateral knee OA, presents with bilateral knee pain.  This complaint has been ongoing for several years.  No mechanism of injury reported at onset. Symptoms have progressively worsened with time.  Pain is most prominent anteriorly.  Most recently has been under the care of Dr. Neri, last having a steroid injection completed in May 2023 which helped for 3 to 4  months.  She also notes she is started using turmeric daily which also has helped mitigate her pain.  Previously was prescribed meloxicam 15 mg which she had used daily and help with her pain.  The most recent refill was placed this was only 7.5 mg and was last helpful in her pain symptoms.    She also mentions developing lower back pain over the last several months.  Denies radicular symptoms.    Exam:  Bilateral knee examined. No effusion, ecchymosis, or erythema.  AROM from 0 to 120 deg with 5/5 strength. SILT overlying knee. Motion crepitus present.  No joint line tenderness or tenderness with patellar compression.  No popliteal mass palpated. Negative anterior and posterior drawer.  No laxity to varus or valgus stress at 0 or 30 deg.  No patellar apprehension.    Results:  7/2023: HbA1c 5.5%  1/2024: GFR 54, Cr 1.08   X-rays of bilateral knees obtained 5/17/2023 reviewed and independent interpreted as advanced tricompartmental degenerative changes.  Reviewed previous encounters with Dr. Neri from 5/17/2023.    Procedure:  Patient ID: Constance Crump is a 73 y.o. female.    L Inj/Asp: bilateral knee on 1/23/2024 1:38 PM  Indications: pain  Details: 25 G needle, ultrasound-guided superolateral approach  Medications (Right): 40 mg triamcinolone acetonide 40 mg/mL; 4 mL lidocaine 10 mg/mL (1 %)  Medications (Left): 40 mg triamcinolone acetonide 40 mg/mL; 4 mL lidocaine 10 mg/mL (1 %)  Outcome: tolerated well, no immediate complications  Procedure, treatment alternatives, risks and benefits explained, specific risks discussed. Consent was given by the patient. Immediately prior to procedure a time out was called to verify the correct patient, procedure, equipment, support staff and site/side marked as required. Patient was prepped and draped in the usual sterile fashion.

## 2024-01-23 NOTE — LETTER
January 23, 2024     Matthew Sadler MD  [id number]  [facility] [address]    Patient: Constance Crump   YOB: 1950   Date of Visit: 1/23/2024       Dear Dr. Matthew Sadler MD:    Thank you for referring Constance Crump to me for evaluation. Below are my notes for this consultation.  If you have questions, please do not hesitate to call me. I look forward to following your patient along with you.       Sincerely,     Elan Linares, DO      CC: No Recipients  ______________________________________________________________________________________    ** Please excuse any errors in grammar or translation related to this dictation. Voice recognition software was utilized to prepare this document. **    Assessment & Plan:  Patient here for ongoing management of bilateral knee arthritis. Patient did inquire about knee replacement surgery.  Discussed with patient that while she does have advanced degenerative changes, if she feels that her symptoms are well-managed with repeat steroid injections and the occasional use of oral meloxicam can continue this for the time being.  If she feels her quality of life is diminished due to pain related arthritis at that time would recommend she speak with a surgeon about arthroplasty.  For the time being she plans to continue nonoperative measures.  Given previous positive response to steroid injection, offered to repeat today which patient was agreeable to have completed.  Can have this repeated in 3 or more months as dictated by symptoms.  For intermittent symptoms can continue use of meloxicam.  New prescription for meloxicam 15mg was ordered as it was more helpful than 7.5mg dose.  Patient was advised to use his medication judiciously due to her reduced GFR (ideally should not take more than 7 days and then stop for 3-4 days) and also informed to speak with her PCP about this.  Can also use acetaminophen, ice pack or heating pad.    All questions answered and patient agrees to this plan.     In regards to her back pain, patient was referred to see one of our ortho spine team members    Chief complaint:  Bilateral knee pain    HPI:  74 y/o patient, hx of HTN and bilateral knee OA, presents with bilateral knee pain.  This complaint has been ongoing for several years.  No mechanism of injury reported at onset. Symptoms have progressively worsened with time.  Pain is most prominent anteriorly.  Most recently has been under the care of Dr. Neri, last having a steroid injection completed in May 2023 which helped for 3 to 4 months.  She also notes she is started using turmeric daily which also has helped mitigate her pain.  Previously was prescribed meloxicam 15 mg which she had used daily and help with her pain.  The most recent refill was placed this was only 7.5 mg and was last helpful in her pain symptoms.    She also mentions developing lower back pain over the last several months.  Denies radicular symptoms.    Exam:  Bilateral knee examined. No effusion, ecchymosis, or erythema.  AROM from 0 to 120 deg with 5/5 strength. SILT overlying knee. Motion crepitus present.  No joint line tenderness or tenderness with patellar compression.  No popliteal mass palpated. Negative anterior and posterior drawer.  No laxity to varus or valgus stress at 0 or 30 deg.  No patellar apprehension.    Results:  7/2023: HbA1c 5.5%  1/2024: GFR 54, Cr 1.08   X-rays of bilateral knees obtained 5/17/2023 reviewed and independent interpreted as advanced tricompartmental degenerative changes.  Reviewed previous encounters with Dr. Neri from 5/17/2023.    Procedure:  Patient ID: Constance Crump is a 73 y.o. female.    L Inj/Asp: bilateral knee on 1/23/2024 1:38 PM  Indications: pain  Details: 25 G needle, ultrasound-guided superolateral approach  Medications (Right): 40 mg triamcinolone acetonide 40 mg/mL; 4 mL lidocaine 10 mg/mL (1 %)  Medications (Left): 40  mg triamcinolone acetonide 40 mg/mL; 4 mL lidocaine 10 mg/mL (1 %)  Outcome: tolerated well, no immediate complications  Procedure, treatment alternatives, risks and benefits explained, specific risks discussed. Consent was given by the patient. Immediately prior to procedure a time out was called to verify the correct patient, procedure, equipment, support staff and site/side marked as required. Patient was prepped and draped in the usual sterile fashion.

## 2024-01-28 NOTE — RESULT ENCOUNTER NOTE
Over the past year kidney function has declined slightly. Continue lisinopril as prescribed. We should consider initiation of either jardiance or farxiga, which are heart and kidney protective medications. These medications can be expensive if not covered by insurance, but we would not know cost until called in. Side effects are possible and most common include dehydration, urinary tract infections, and yeast infections. Please let me know if willing to begin a new medication and I will call one in.     Cholesterol panel looks good.

## 2024-01-30 ENCOUNTER — TELEPHONE (OUTPATIENT)
Dept: PRIMARY CARE | Facility: CLINIC | Age: 74
End: 2024-01-30
Payer: MEDICARE

## 2024-02-08 PROBLEM — R26.2 DIFFICULTY IN WALKING INVOLVING JOINT OF MULTIPLE SITES: Status: ACTIVE | Noted: 2024-02-08

## 2024-02-08 NOTE — PROGRESS NOTES
Physical Therapy    Physical Therapy Evaluation and Treatment      Patient Name: Constance Crump  MRN: [id number]  Today's Date: 2/9/24  Time Calculation  Start Time: 1300  Stop Time: 1400  Time Calculation (min): 60 min    Assessment:    Patient presents with clinical signs and symptoms R hip muscle weakness and R knee valgus deformity. Her entire R proximal posterior lateral lower quarter is hypersenstive to palpation. These impairments affect ADLs, work, recreational activity, exercise, transfer ability, ambulation, and sleep function that requires skilled PT intervention to resolve and enable patient to return to previous level of function. Factors that may affect progress in PT are chronic pain, obesity, medical co-morbidities,  and patient compliance.  Patient response to initial treatment of  R hip ice massage and self management education was  understood by Constance Crump     Plan:  OP PT Plan  Treatment/Interventions: Cryotherapy, Education/ Instruction, Hot pack, Manual therapy, Neuromuscular re-education, Self care/ home management, Taping techniques, Therapeutic activities, Therapeutic exercises  Teach  kinesiotape technique R hip    Current Problem:   1. Trochanteric bursitis of right hip  Referral to Physical Therapy    Follow Up In Physical Therapy      2. Greater trochanteric pain syndrome of right lower extremity        3. Chronic pain of both knees  Follow Up In Physical Therapy      4. Difficulty in walking involving joint of multiple sites  Follow Up In Physical Therapy      5. Primary osteoarthritis of both knees  Referral to Physical Therapy    Follow Up In Physical Therapy      6. Right hip pain  Follow Up In Physical Therapy          Subjective    Bilat knee and R hip pain Last several years. She has had several steroid injections in hips and knees that have helped a little.  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    Objective   Cognition:   A+Ox3  Observation:      Hip and ankle joint clearance:  Bilat hips clear  Posture:     R genu valgus      Single leg stance:  Eyes open  R  sec   L  sec unable    Knee ROM: Flexion  AROM  R 125   L 126 deg  PROM:  R  L  deg, Extension  AROM  R  L 0 deg    Knee Strength:  Flex  R 4/5   L 4/5,  Ext R  4/5  L 4/5     Hip Strength:  Abduction  R  3-/5 P,  L  3/5                  Flexion R 4 /5   L 4 /5    Ligament testing:  Lachman's  R  L   medial collateral    R  L  lateral collateral   R  L    Mary Grace's   R  L    + All neg    Palpation: tenderness to medial/ lateral joint line bilat   , R posterior lateral hip to gr trochnter and ITB    Trigger points:  VMO   rectus femoris  VL    gatroc  soleus   glut med   glut max   ITB  Gait: antalgic  Wbing  device    Special Tests:   SLR quad lag   no ,  squat strategy :   hip hinge with R knee valgus collapse and anterior medial R knee pain    Outcome Measures:  LEFS 45/80    Treatments:  Modality:  Ice massage R posterior lateral hip 5 min  There ex:  L sidelyng clam shell for R gute medius activation 2x12  EDUCATION:   extensive education regarding mechanism of injury, relevant functional anatomy, treatment program rational, self management, HEP, and POC . Specific instructions regarding self ice massage and request to bring  to next appointment to learn how t kinesiotape R hip    Goals  Decrease R hip  pain to 0-4/10 severity level   Increase R hip abductor  strength to   3/5 MMT grade without pain  Improve LEFS score by 10 points  independent Home exercise program   Constance Crump will be able to walk community distances including reasonable  environmental obstacles and steps with minimal hip and knee pain 0-4/10

## 2024-02-09 ENCOUNTER — EVALUATION (OUTPATIENT)
Dept: PHYSICAL THERAPY | Facility: CLINIC | Age: 74
End: 2024-02-09
Payer: MEDICARE

## 2024-02-09 DIAGNOSIS — R26.2 DIFFICULTY IN WALKING INVOLVING JOINT OF MULTIPLE SITES: ICD-10-CM

## 2024-02-09 DIAGNOSIS — M70.61 TROCHANTERIC BURSITIS OF RIGHT HIP: Primary | ICD-10-CM

## 2024-02-09 DIAGNOSIS — M17.0 PRIMARY OSTEOARTHRITIS OF BOTH KNEES: ICD-10-CM

## 2024-02-09 DIAGNOSIS — M25.551 RIGHT HIP PAIN: ICD-10-CM

## 2024-02-09 DIAGNOSIS — G89.29 CHRONIC PAIN OF BOTH KNEES: ICD-10-CM

## 2024-02-09 DIAGNOSIS — M25.562 CHRONIC PAIN OF BOTH KNEES: ICD-10-CM

## 2024-02-09 DIAGNOSIS — M25.561 CHRONIC PAIN OF BOTH KNEES: ICD-10-CM

## 2024-02-09 DIAGNOSIS — M25.551 GREATER TROCHANTERIC PAIN SYNDROME OF RIGHT LOWER EXTREMITY: ICD-10-CM

## 2024-02-09 PROCEDURE — 97535 SELF CARE MNGMENT TRAINING: CPT | Mod: GP | Performed by: PHYSICAL THERAPIST

## 2024-02-09 PROCEDURE — 97010 HOT OR COLD PACKS THERAPY: CPT | Mod: GP | Performed by: PHYSICAL THERAPIST

## 2024-02-09 PROCEDURE — 97110 THERAPEUTIC EXERCISES: CPT | Mod: GP | Performed by: PHYSICAL THERAPIST

## 2024-02-09 PROCEDURE — 97162 PT EVAL MOD COMPLEX 30 MIN: CPT | Mod: GP | Performed by: PHYSICAL THERAPIST

## 2024-02-09 ASSESSMENT — ENCOUNTER SYMPTOMS
DEPRESSION: 0
LOSS OF SENSATION IN FEET: 0
OCCASIONAL FEELINGS OF UNSTEADINESS: 0

## 2024-02-14 DIAGNOSIS — I10 PRIMARY HYPERTENSION: ICD-10-CM

## 2024-02-15 RX ORDER — LISINOPRIL 40 MG/1
40 TABLET ORAL DAILY
Qty: 90 TABLET | Refills: 0 | Status: SHIPPED | OUTPATIENT
Start: 2024-02-15

## 2024-02-23 PROBLEM — M70.61 TROCHANTERIC BURSITIS OF RIGHT HIP: Status: ACTIVE | Noted: 2024-02-23

## 2024-02-24 DIAGNOSIS — E55.9 VITAMIN D DEFICIENCY: ICD-10-CM

## 2024-02-27 RX ORDER — ACETAMINOPHEN 500 MG
TABLET ORAL
Qty: 90 CAPSULE | Refills: 0 | Status: SHIPPED | OUTPATIENT
Start: 2024-02-27

## 2024-02-29 DIAGNOSIS — M17.0 PRIMARY OSTEOARTHRITIS OF BOTH KNEES: ICD-10-CM

## 2024-02-29 DIAGNOSIS — E78.5 HYPERLIPIDEMIA, UNSPECIFIED HYPERLIPIDEMIA TYPE: ICD-10-CM

## 2024-02-29 RX ORDER — TRIAMCINOLONE ACETONIDE 32 MG
SUSPENSION,EXTENDED RELEASE VIAL (EA) INTRAARTICULAR
Qty: 10 ML | Refills: 0 | Status: SHIPPED | OUTPATIENT
Start: 2024-02-29

## 2024-02-29 RX ORDER — ROSUVASTATIN CALCIUM 5 MG/1
TABLET, COATED ORAL
Qty: 36 TABLET | Refills: 3 | Status: SHIPPED | OUTPATIENT
Start: 2024-02-29

## 2024-03-04 ENCOUNTER — OFFICE VISIT (OUTPATIENT)
Dept: CARDIOLOGY | Facility: CLINIC | Age: 74
End: 2024-03-04
Payer: MEDICARE

## 2024-03-04 VITALS
BODY MASS INDEX: 29.86 KG/M2 | HEART RATE: 78 BPM | DIASTOLIC BLOOD PRESSURE: 87 MMHG | HEIGHT: 68 IN | WEIGHT: 197.06 LBS | OXYGEN SATURATION: 100 % | SYSTOLIC BLOOD PRESSURE: 135 MMHG

## 2024-03-04 DIAGNOSIS — I48.11 LONGSTANDING PERSISTENT ATRIAL FIBRILLATION (MULTI): ICD-10-CM

## 2024-03-04 DIAGNOSIS — I42.0 DILATED CARDIOMYOPATHY (MULTI): ICD-10-CM

## 2024-03-04 DIAGNOSIS — I10 BENIGN HYPERTENSION: Primary | ICD-10-CM

## 2024-03-04 DIAGNOSIS — I10 PRIMARY HYPERTENSION: ICD-10-CM

## 2024-03-04 PROCEDURE — 99214 OFFICE O/P EST MOD 30 MIN: CPT | Performed by: NURSE PRACTITIONER

## 2024-03-04 PROCEDURE — 1036F TOBACCO NON-USER: CPT | Performed by: NURSE PRACTITIONER

## 2024-03-04 PROCEDURE — 3079F DIAST BP 80-89 MM HG: CPT | Performed by: NURSE PRACTITIONER

## 2024-03-04 PROCEDURE — 99214 OFFICE O/P EST MOD 30 MIN: CPT | Mod: 25 | Performed by: NURSE PRACTITIONER

## 2024-03-04 PROCEDURE — 93010 ELECTROCARDIOGRAM REPORT: CPT | Performed by: INTERNAL MEDICINE

## 2024-03-04 PROCEDURE — 1126F AMNT PAIN NOTED NONE PRSNT: CPT | Performed by: NURSE PRACTITIONER

## 2024-03-04 PROCEDURE — 1157F ADVNC CARE PLAN IN RCRD: CPT | Performed by: NURSE PRACTITIONER

## 2024-03-04 PROCEDURE — 3075F SYST BP GE 130 - 139MM HG: CPT | Performed by: NURSE PRACTITIONER

## 2024-03-04 PROCEDURE — 1159F MED LIST DOCD IN RCRD: CPT | Performed by: NURSE PRACTITIONER

## 2024-03-04 PROCEDURE — 93005 ELECTROCARDIOGRAM TRACING: CPT | Performed by: NURSE PRACTITIONER

## 2024-03-04 PROCEDURE — 1160F RVW MEDS BY RX/DR IN RCRD: CPT | Performed by: NURSE PRACTITIONER

## 2024-03-04 ASSESSMENT — PAIN SCALES - GENERAL: PAINLEVEL: 0-NO PAIN

## 2024-03-04 ASSESSMENT — ENCOUNTER SYMPTOMS
LOSS OF SENSATION IN FEET: 0
DEPRESSION: 0
CARDIOVASCULAR NEGATIVE: 1
HYPERTENSION: 1
OCCASIONAL FEELINGS OF UNSTEADINESS: 0
RESPIRATORY NEGATIVE: 1

## 2024-03-04 NOTE — PROGRESS NOTES
Subjective   Constance Crump is a 73 y.o. female.    Chief Complaint:  none    Hypertension    Ms. Crump is seen for a follow-up visit.  She is seen in collaboration with Dr. Veloz.  Denies any hospitalizations or ED visits.  He has had no recent surgeries or significant changes to her health.  She is compliant with all medication and appears to tolerate them well.  She does not need any refills at this time.  She is changing primary care providers and may need refills in the near future.  She continues to exercise modestly due to some knee arthritis.  She denies any symptoms referable to angina or heart failure.  She does have some generalized fatigue.    Review of Systems   Cardiovascular: Negative.    Respiratory: Negative.     All other systems reviewed and are negative.      Objective   Vitals reviewed.   Constitutional:       Appearance: Healthy appearance. Not in distress.   Neck:      Vascular: No JVR. JVD normal.   Pulmonary:      Effort: Pulmonary effort is normal.      Breath sounds: Normal breath sounds. No wheezing. No rhonchi. No rales.   Chest:      Chest wall: Not tender to palpatation.   Cardiovascular:      Normal rate. Regular rhythm. Normal S1. Normal S2.       Murmurs: There is no murmur.      No gallop.  No click. No rub.   Pulses:     Intact distal pulses.   Edema:     Peripheral edema absent.   Abdominal:      Tenderness: There is no abdominal tenderness.   Musculoskeletal: Normal range of motion.         General: No tenderness. Skin:     General: Skin is warm and dry.   Neurological:      General: No focal deficit present.      Mental Status: Alert and oriented to person, place and time.         Lab Review:   Lab Results   Component Value Date     01/17/2024    K 4.9 01/17/2024     01/17/2024    CO2 28 01/17/2024    BUN 21 01/17/2024    CREATININE 1.08 (H) 01/17/2024    GLUCOSE 90 01/17/2024    CALCIUM 9.9 01/17/2024     Lab Results   Component Value Date    WBC 8.8  07/10/2023    HGB 13.9 07/10/2023    HCT 43.4 07/10/2023    MCV 91 07/10/2023     07/10/2023     Lab Results   Component Value Date    CHOL 157 01/17/2024    TRIG 95 01/17/2024    HDL 58.8 01/17/2024     ECG obtained and reviewed shows a normal sinus rhythm with a nonspecific ST and T wave abnormality, ventricular rate is 82 bpm      Assessment/Plan   Mrs. Crump is a pleasant 73-year-old female with a past medical history significant for nonischemic cardiomyopathy with angiographically normal coronary arteries by catheterization 2012, improved ejection fraction with most recent echo 6/2022 showing an LVEF of 60% and hypertension. She presents relatively stable from a cardiac standpoint except for some fatigue.  Vital signs and ECG are stable.  Recent lipid panel is improved on low-dose rosuvastatin 3 times weekly.  I would like her to continue her current medication unchanged.  I encouraged her to continue to exercise as tolerated.  As it has been 2 years since we evaluated her LV function she will be scheduled for an echocardiogram with her next visit given her history of cardiomyopathy.  She will return in another 6 months and knows to call for any interim concerns or questions.  I did offer to provide medication refills should she need them prior to establishing with a new PCP.

## 2024-03-05 LAB
ATRIAL RATE: 82 BPM
P AXIS: 10 DEGREES
P OFFSET: 184 MS
P ONSET: 131 MS
PR INTERVAL: 160 MS
Q ONSET: 211 MS
QRS COUNT: 14 BEATS
QRS DURATION: 100 MS
QT INTERVAL: 394 MS
QTC CALCULATION(BAZETT): 460 MS
QTC FREDERICIA: 437 MS
R AXIS: -10 DEGREES
T AXIS: -21 DEGREES
T OFFSET: 408 MS
VENTRICULAR RATE: 82 BPM

## 2024-03-29 NOTE — PROGRESS NOTES
Physical Therapy    Physical Therapy Evaluation and Treatment      Patient Name: Constance Crump  MRN: [id number]  Today's Date: 4/1/24  Visit 2  Time Calculation  Start Time: 0915  Stop Time: 1005  Time Calculation (min): 50 min       PT Therapeutic Procedures Time Entry  Manual Therapy Time Entry: 25  Therapeutic Exercise Time Entry: 15    PT Modalities Time Entry  Hot/Cold Pack Time Entry: 5           Assessment:  Constance Crump  demonstrates ability tape and Ice massage R hip. She will be more consistent with ice massage to desensitize the hip    Plan:     POC progressing hip strength exercise program    Current Problem:   1. Difficulty in walking involving joint of multiple sites  Follow Up In Physical Therapy      2. Greater trochanteric pain syndrome of right lower extremity        3. Chronic pain of both knees  Follow Up In Physical Therapy      4. Trochanteric bursitis of right hip  Follow Up In Physical Therapy      5. Primary osteoarthritis of both knees  Follow Up In Physical Therapy      6. Right hip pain  Follow Up In Physical Therapy          Subjective    Constance Crump notes that her hip pain is less severe overall, she had a bout of increased of R hip pain this morning 8/10 , but now 3/10     General:         Objective   Cognition:   A+Ox3  Observation:      Outcome:  LEFS 45/80    Treatments:  Manual therapy  Strain counter strain technique to reduce muscle hypertonus to R glute med and max   Kinesiotape to R glute max and med O to I 25% tape off tension   Modality:  Ice massage R posterior lateral hip 5 min  There ex:  L sidelyng clam shell for R gute medius activation 2x12  EDUCATION:   Specific instructions regarding  ice massage and   instructed how to kinesiotape R hip and perform TP pressure technique to mitigate R hip pain    Goals  Decrease R hip  pain to 0-4/10 severity level   Increase R hip abductor  strength to   3/5 MMT grade without pain  Improve LEFS score by 10  points  independent Home exercise program   Constance Crump will be able to walk community distances including reasonable environmental obstacles and steps with minimal hip and knee pain 0-4/10

## 2024-04-01 ENCOUNTER — TREATMENT (OUTPATIENT)
Dept: PHYSICAL THERAPY | Facility: CLINIC | Age: 74
End: 2024-04-01
Payer: MEDICARE

## 2024-04-01 DIAGNOSIS — M25.562 CHRONIC PAIN OF BOTH KNEES: ICD-10-CM

## 2024-04-01 DIAGNOSIS — G89.29 CHRONIC PAIN OF BOTH KNEES: ICD-10-CM

## 2024-04-01 DIAGNOSIS — M25.551 RIGHT HIP PAIN: ICD-10-CM

## 2024-04-01 DIAGNOSIS — M25.551 GREATER TROCHANTERIC PAIN SYNDROME OF RIGHT LOWER EXTREMITY: ICD-10-CM

## 2024-04-01 DIAGNOSIS — M17.0 PRIMARY OSTEOARTHRITIS OF BOTH KNEES: ICD-10-CM

## 2024-04-01 DIAGNOSIS — R26.2 DIFFICULTY IN WALKING INVOLVING JOINT OF MULTIPLE SITES: Primary | ICD-10-CM

## 2024-04-01 DIAGNOSIS — M25.561 CHRONIC PAIN OF BOTH KNEES: ICD-10-CM

## 2024-04-01 DIAGNOSIS — M70.61 TROCHANTERIC BURSITIS OF RIGHT HIP: ICD-10-CM

## 2024-04-01 PROCEDURE — 97140 MANUAL THERAPY 1/> REGIONS: CPT | Mod: GP | Performed by: PHYSICAL THERAPIST

## 2024-04-01 PROCEDURE — 97110 THERAPEUTIC EXERCISES: CPT | Mod: GP | Performed by: PHYSICAL THERAPIST

## 2024-04-01 PROCEDURE — 97010 HOT OR COLD PACKS THERAPY: CPT | Mod: GP | Performed by: PHYSICAL THERAPIST

## 2024-06-14 DIAGNOSIS — I10 PRIMARY HYPERTENSION: Primary | ICD-10-CM

## 2024-06-14 DIAGNOSIS — E55.9 VITAMIN D DEFICIENCY: ICD-10-CM

## 2024-06-14 RX ORDER — ACETAMINOPHEN 500 MG
2000 TABLET ORAL DAILY
Qty: 90 CAPSULE | Refills: 3 | Status: SHIPPED | OUTPATIENT
Start: 2024-06-14 | End: 2025-06-14

## 2024-06-14 RX ORDER — METOPROLOL SUCCINATE 100 MG/1
100 TABLET, EXTENDED RELEASE ORAL DAILY
Qty: 90 TABLET | Refills: 3 | Status: SHIPPED | OUTPATIENT
Start: 2024-06-14 | End: 2025-06-14

## 2024-07-15 ENCOUNTER — APPOINTMENT (OUTPATIENT)
Dept: PRIMARY CARE | Facility: CLINIC | Age: 74
End: 2024-07-15
Payer: MEDICARE

## 2024-08-21 ENCOUNTER — APPOINTMENT (OUTPATIENT)
Dept: PRIMARY CARE | Facility: CLINIC | Age: 74
End: 2024-08-21
Payer: MEDICARE

## 2024-08-21 VITALS
DIASTOLIC BLOOD PRESSURE: 79 MMHG | HEIGHT: 68 IN | WEIGHT: 206 LBS | SYSTOLIC BLOOD PRESSURE: 136 MMHG | BODY MASS INDEX: 31.22 KG/M2 | HEART RATE: 58 BPM

## 2024-08-21 DIAGNOSIS — M70.61 TROCHANTERIC BURSITIS OF RIGHT HIP: ICD-10-CM

## 2024-08-21 DIAGNOSIS — Z12.31 ENCOUNTER FOR SCREENING MAMMOGRAM FOR MALIGNANT NEOPLASM OF BREAST: ICD-10-CM

## 2024-08-21 DIAGNOSIS — R25.2 LEG CRAMPS: ICD-10-CM

## 2024-08-21 DIAGNOSIS — Z23 NEED FOR TDAP VACCINATION: Primary | ICD-10-CM

## 2024-08-21 DIAGNOSIS — R09.89 OTHER SPECIFIED SYMPTOMS AND SIGNS INVOLVING THE CIRCULATORY AND RESPIRATORY SYSTEMS: ICD-10-CM

## 2024-08-21 DIAGNOSIS — I10 HYPERTENSION, UNSPECIFIED TYPE: ICD-10-CM

## 2024-08-21 DIAGNOSIS — R79.9 ABNORMAL FINDING OF BLOOD CHEMISTRY, UNSPECIFIED: ICD-10-CM

## 2024-08-21 DIAGNOSIS — Z00.00 HEALTHCARE MAINTENANCE: ICD-10-CM

## 2024-08-21 DIAGNOSIS — E78.5 HYPERLIPIDEMIA, UNSPECIFIED HYPERLIPIDEMIA TYPE: ICD-10-CM

## 2024-08-21 DIAGNOSIS — M25.551 PAIN OF RIGHT HIP: ICD-10-CM

## 2024-08-21 PROCEDURE — 3078F DIAST BP <80 MM HG: CPT | Performed by: STUDENT IN AN ORGANIZED HEALTH CARE EDUCATION/TRAINING PROGRAM

## 2024-08-21 PROCEDURE — 1123F ACP DISCUSS/DSCN MKR DOCD: CPT | Performed by: STUDENT IN AN ORGANIZED HEALTH CARE EDUCATION/TRAINING PROGRAM

## 2024-08-21 PROCEDURE — 1036F TOBACCO NON-USER: CPT | Performed by: STUDENT IN AN ORGANIZED HEALTH CARE EDUCATION/TRAINING PROGRAM

## 2024-08-21 PROCEDURE — 1157F ADVNC CARE PLAN IN RCRD: CPT | Performed by: STUDENT IN AN ORGANIZED HEALTH CARE EDUCATION/TRAINING PROGRAM

## 2024-08-21 PROCEDURE — 3008F BODY MASS INDEX DOCD: CPT | Performed by: STUDENT IN AN ORGANIZED HEALTH CARE EDUCATION/TRAINING PROGRAM

## 2024-08-21 PROCEDURE — 90471 IMMUNIZATION ADMIN: CPT | Performed by: STUDENT IN AN ORGANIZED HEALTH CARE EDUCATION/TRAINING PROGRAM

## 2024-08-21 PROCEDURE — 99214 OFFICE O/P EST MOD 30 MIN: CPT | Performed by: STUDENT IN AN ORGANIZED HEALTH CARE EDUCATION/TRAINING PROGRAM

## 2024-08-21 PROCEDURE — 3075F SYST BP GE 130 - 139MM HG: CPT | Performed by: STUDENT IN AN ORGANIZED HEALTH CARE EDUCATION/TRAINING PROGRAM

## 2024-08-21 PROCEDURE — 90715 TDAP VACCINE 7 YRS/> IM: CPT | Performed by: STUDENT IN AN ORGANIZED HEALTH CARE EDUCATION/TRAINING PROGRAM

## 2024-08-21 RX ORDER — METHYLPREDNISOLONE 4 MG/1
TABLET ORAL
Qty: 21 TABLET | Refills: 0 | Status: SHIPPED | OUTPATIENT
Start: 2024-08-21 | End: 2024-08-28

## 2024-08-21 ASSESSMENT — ENCOUNTER SYMPTOMS
ABDOMINAL PAIN: 0
SPEECH DIFFICULTY: 0
ACTIVITY CHANGE: 0
DIZZINESS: 0
WHEEZING: 0
SHORTNESS OF BREATH: 0
NUMBNESS: 0
DYSURIA: 0
WEAKNESS: 0
FEVER: 0
HEMATURIA: 0
CONSTIPATION: 0
NAUSEA: 0
COUGH: 0
VOMITING: 0

## 2024-08-21 NOTE — PROGRESS NOTES
Subjective   Patient ID: Constance Crump is a 74 y.o. female who presents for New Patient Visit.  HPI  Constance Crump is 74 y.o. is here to establish care    Chronic active medical problems   hyperlipidemia  Hypertension    Aspirin 81 nonischemic cardiomyopathy with angiographically normal coronary arteries by catheterization 2012, following up with cardiology    Rosuvastatin 5  Lisinopril 40  Amlo 5     Other history as per chart     cancer screening  1Pap smear 2017-negative for malignancy with negative HPV   Colonoscopy-2022 repeat in 5 years   Mammogram-due    Immunizations  Tdap due-agreeable to get today  Shingles will get at a local pharmacy  PREvnar-up-to-date January 2023    Reports on hip pain hip pain.  Issues before was diagnosed to have iliotibial band syndrome along with bursitis, given prednisone injections in the past  Reports same pain.  No trauma or fever reported      Also reports ongoing leg cramps especially in the right thigh radiating down to right leg.        Assessment/plan  1.  Hypertension-blood pressure at goal.  To continue lisinopril 40 and at amlodipine 5  2.  Hyperlipidemia repeat labs.  Continue rosuvastatin [takes 3 times a day].  Follows up with cardiology  3.  Healthcare maintenance-mammogram ordered.  Agreeable to get Tdap today  4.  Right hip pain.-Examination significant for tenderness at trochanteric area.  Possibly trochanteric bursal bursitis with contributing iliotibial band syndrome.  We discussed benefits and risks of steroid.  Patient agreeable to take it.  Also physical therapy referral in place  5-bilateral leg cramps more on the right.  Neurovascularly intact.  We will get PVR testing with exercise to rule out vascular causes.  Differential diagnosis includes medication side effects [?  Rosuvastatin] therefore we will check CK    We will call the patient with abnormal labs if any and discuss necessary changes based on labs; otherwise patient to follow up in 3  months for HTN/ HLD  and in 1 year for next physical exam     Past Medical History:   Diagnosis Date    Benign neoplasm of pituitary gland (Multi)     Pituitary adenoma    Cardiomegaly 10/30/2013    Left ventricular hypertrophy    Exposure to COVID-19 virus 07/10/2023    Nasal congestion 07/10/2023    Personal history of other endocrine, nutritional and metabolic disease     History of goiter      Past Surgical History:   Procedure Laterality Date    CHOLECYSTECTOMY  09/29/2014    Cholecystectomy    MOUTH SURGERY  09/11/2018    Oral Surgery Tooth Extraction    OTHER SURGICAL HISTORY  02/11/2016    Transsphenoidal Tumor Removal    OTHER SURGICAL HISTORY  04/22/2015    Thyroid Surgery Sub-Total Thyroidectomy      Family History   Problem Relation Name Age of Onset    Other (brain tumor) Mother      Endometrial cancer Mother      Stroke Mother          CVA    Diabetes Mother      Hypertension Mother      Breast cancer Mother      Ovarian cancer Mother      Colon cancer Father      Endometrial cancer Father      Coronary artery disease Father      Breast cancer Father      Ovarian cancer Father      Hypertension Sister      Diabetes Brother      Hypertension Brother      Diabetes Mother's Sister      Colon cancer Maternal Grandmother      Diabetes Maternal Grandmother      Stroke Maternal Grandfather          CVA    Colon cancer Paternal Grandfather      Endometrial cancer Other multiple family members     Breast cancer Other multiple family members     Colon cancer Other multiple family members     Colon cancer Cousin maternal       No Known Allergies       Occupation:     Review of Systems   Constitutional:  Negative for activity change and fever.   HENT:  Negative for congestion.    Respiratory:  Negative for cough, shortness of breath and wheezing.    Cardiovascular:  Negative for chest pain and leg swelling.   Gastrointestinal:  Negative for abdominal pain, constipation, nausea and vomiting.   Endocrine: Negative  "for cold intolerance.   Genitourinary:  Negative for dysuria, hematuria and urgency.   Neurological:  Negative for dizziness, speech difficulty, weakness and numbness.   Psychiatric/Behavioral:  Negative for self-injury and suicidal ideas.        Objective   Visit Vitals  /79   Pulse 58   Ht 1.727 m (5' 8\")   Wt 93.4 kg (206 lb)   BMI 31.32 kg/m²   Smoking Status Never   BSA 2.12 m²      Physical Exam  Constitutional:       Appearance: Normal appearance.   HENT:      Head: Normocephalic and atraumatic.      Nose: Nose normal.      Mouth/Throat:      Mouth: Mucous membranes are moist.   Eyes:      Conjunctiva/sclera: Conjunctivae normal.      Pupils: Pupils are equal, round, and reactive to light.   Cardiovascular:      Rate and Rhythm: Normal rate and regular rhythm.      Pulses: Normal pulses.      Heart sounds: Normal heart sounds.   Pulmonary:      Effort: Pulmonary effort is normal.      Breath sounds: Normal breath sounds.   Musculoskeletal:         General: Normal range of motion.      Cervical back: Neck supple.   Skin:     General: Skin is warm.   Neurological:      General: No focal deficit present.      Mental Status: She is alert and oriented to person, place, and time.   Psychiatric:         Mood and Affect: Mood normal.         Behavior: Behavior normal.         Thought Content: Thought content normal.         Judgment: Judgment normal.     Tenderness right trochanter +    Assessment/Plan   Diagnoses and all orders for this visit:  Need for Tdap vaccination  -     Tdap vaccine, age 7 years and older  (BOOSTRIX)  Pain of right hip  -     XR hips bilateral 2 VW w pelvis when performed; Future  -     Referral to Physical Therapy; Future  Leg cramps  -     Vascular US PVR with exercise; Future  -     CK; Future  Healthcare maintenance  -     Hemoglobin A1C; Future  -     Lipid Panel; Future  -     Comprehensive Metabolic Panel; Future  -     CBC; Future  -     TSH with reflex to Free T4 if abnormal; " Future  -     BI mammo bilateral screening tomosynthesis; Future  Hypertension, unspecified type  Hyperlipidemia, unspecified hyperlipidemia type  -     Lipid Panel; Future  -     TSH with reflex to Free T4 if abnormal; Future  Abnormal finding of blood chemistry, unspecified  -     CBC; Future  Encounter for screening mammogram for malignant neoplasm of breast  -     BI mammo bilateral screening tomosynthesis; Future  Other specified symptoms and signs involving the circulatory and respiratory systems  -     Vascular US PVR with exercise; Future  Trochanteric bursitis of right hip  -     methylPREDNISolone (Medrol Dospak) 4 mg tablets; Take as directed on package.

## 2024-09-09 ENCOUNTER — HOSPITAL ENCOUNTER (OUTPATIENT)
Dept: CARDIOLOGY | Facility: CLINIC | Age: 74
Discharge: HOME | End: 2024-09-09
Payer: MEDICARE

## 2024-09-09 ENCOUNTER — OFFICE VISIT (OUTPATIENT)
Dept: CARDIOLOGY | Facility: CLINIC | Age: 74
End: 2024-09-09
Payer: MEDICARE

## 2024-09-09 VITALS
HEART RATE: 56 BPM | SYSTOLIC BLOOD PRESSURE: 129 MMHG | OXYGEN SATURATION: 97 % | WEIGHT: 207.1 LBS | DIASTOLIC BLOOD PRESSURE: 82 MMHG | HEIGHT: 68 IN | BODY MASS INDEX: 31.39 KG/M2

## 2024-09-09 DIAGNOSIS — I48.11 LONGSTANDING PERSISTENT ATRIAL FIBRILLATION (MULTI): ICD-10-CM

## 2024-09-09 DIAGNOSIS — I10 BENIGN HYPERTENSION: ICD-10-CM

## 2024-09-09 DIAGNOSIS — G47.30 SLEEP APNEA, UNSPECIFIED TYPE: Primary | ICD-10-CM

## 2024-09-09 DIAGNOSIS — I42.0 DILATED CARDIOMYOPATHY (MULTI): ICD-10-CM

## 2024-09-09 DIAGNOSIS — E78.00 PURE HYPERCHOLESTEROLEMIA: ICD-10-CM

## 2024-09-09 DIAGNOSIS — I10 PRIMARY HYPERTENSION: ICD-10-CM

## 2024-09-09 DIAGNOSIS — I35.9 AORTIC VALVE DISORDER: ICD-10-CM

## 2024-09-09 DIAGNOSIS — R10.9 ABDOMINAL DISCOMFORT: ICD-10-CM

## 2024-09-09 DIAGNOSIS — R07.89 ATYPICAL CHEST PAIN: ICD-10-CM

## 2024-09-09 DIAGNOSIS — R53.83 OTHER FATIGUE: ICD-10-CM

## 2024-09-09 LAB
ATRIAL RATE: 59 BPM
P AXIS: -1 DEGREES
P OFFSET: 187 MS
P ONSET: 133 MS
PR INTERVAL: 172 MS
Q ONSET: 219 MS
QRS COUNT: 10 BEATS
QRS DURATION: 100 MS
QT INTERVAL: 438 MS
QTC CALCULATION(BAZETT): 433 MS
QTC FREDERICIA: 435 MS
R AXIS: -4 DEGREES
T AXIS: 2 DEGREES
T OFFSET: 438 MS
VENTRICULAR RATE: 59 BPM

## 2024-09-09 PROCEDURE — 1159F MED LIST DOCD IN RCRD: CPT | Performed by: NURSE PRACTITIONER

## 2024-09-09 PROCEDURE — 1036F TOBACCO NON-USER: CPT | Performed by: NURSE PRACTITIONER

## 2024-09-09 PROCEDURE — 3074F SYST BP LT 130 MM HG: CPT | Performed by: NURSE PRACTITIONER

## 2024-09-09 PROCEDURE — 1160F RVW MEDS BY RX/DR IN RCRD: CPT | Performed by: NURSE PRACTITIONER

## 2024-09-09 PROCEDURE — 93321 DOPPLER ECHO F-UP/LMTD STD: CPT | Performed by: INTERNAL MEDICINE

## 2024-09-09 PROCEDURE — 93325 DOPPLER ECHO COLOR FLOW MAPG: CPT

## 2024-09-09 PROCEDURE — 93005 ELECTROCARDIOGRAM TRACING: CPT | Performed by: NURSE PRACTITIONER

## 2024-09-09 PROCEDURE — 1157F ADVNC CARE PLAN IN RCRD: CPT | Performed by: NURSE PRACTITIONER

## 2024-09-09 PROCEDURE — 1126F AMNT PAIN NOTED NONE PRSNT: CPT | Performed by: NURSE PRACTITIONER

## 2024-09-09 PROCEDURE — 99214 OFFICE O/P EST MOD 30 MIN: CPT | Mod: 25 | Performed by: NURSE PRACTITIONER

## 2024-09-09 PROCEDURE — 93010 ELECTROCARDIOGRAM REPORT: CPT | Performed by: INTERNAL MEDICINE

## 2024-09-09 PROCEDURE — 93325 DOPPLER ECHO COLOR FLOW MAPG: CPT | Performed by: INTERNAL MEDICINE

## 2024-09-09 PROCEDURE — 3079F DIAST BP 80-89 MM HG: CPT | Performed by: NURSE PRACTITIONER

## 2024-09-09 PROCEDURE — 93308 TTE F-UP OR LMTD: CPT | Performed by: INTERNAL MEDICINE

## 2024-09-09 PROCEDURE — 99214 OFFICE O/P EST MOD 30 MIN: CPT | Performed by: NURSE PRACTITIONER

## 2024-09-09 PROCEDURE — 3008F BODY MASS INDEX DOCD: CPT | Performed by: NURSE PRACTITIONER

## 2024-09-09 PROCEDURE — 1123F ACP DISCUSS/DSCN MKR DOCD: CPT | Performed by: NURSE PRACTITIONER

## 2024-09-09 RX ORDER — LISINOPRIL 40 MG/1
40 TABLET ORAL DAILY
Qty: 90 TABLET | Refills: 1 | Status: SHIPPED | OUTPATIENT
Start: 2024-09-09

## 2024-09-09 ASSESSMENT — ENCOUNTER SYMPTOMS
CONSTITUTIONAL NEGATIVE: 1
HYPERTENSION: 1
CARDIOVASCULAR NEGATIVE: 1
RESPIRATORY NEGATIVE: 1

## 2024-09-09 ASSESSMENT — COLUMBIA-SUICIDE SEVERITY RATING SCALE - C-SSRS
1. IN THE PAST MONTH, HAVE YOU WISHED YOU WERE DEAD OR WISHED YOU COULD GO TO SLEEP AND NOT WAKE UP?: NO
6. HAVE YOU EVER DONE ANYTHING, STARTED TO DO ANYTHING, OR PREPARED TO DO ANYTHING TO END YOUR LIFE?: NO
2. HAVE YOU ACTUALLY HAD ANY THOUGHTS OF KILLING YOURSELF?: NO

## 2024-09-09 ASSESSMENT — PAIN SCALES - GENERAL: PAINLEVEL: 0-NO PAIN

## 2024-09-09 NOTE — PROGRESS NOTES
Subjective   Constance Crump is a 74 y.o. female.    Chief Complaint:  Follow-up and Hypertension    Hypertension  Ms. Crump is seen for follow-up.  She is seen in collaboration with Dr. Veloz.  She had a surveillance echocardiogram this morning with results pending.  She indicates feeling some fatigue otherwise has no complaints today.  She believes that she is sleeping well overnight although her  has told her that she snores.  She does have a history of sleep apnea currently untreated.  She is tolerating her medications and appears to be compliant in taking them.  She denies any recent hospitalizations or ED visits.  She does have orders to obtain labs from her PCP.      Review of Systems   Constitutional: Negative.   Cardiovascular: Negative.    Respiratory: Negative.     All other systems reviewed and are negative.      Objective   Vitals reviewed.   Constitutional:       Appearance: Healthy appearance. Not in distress.   Neck:      Vascular: JVD normal.   Pulmonary:      Effort: Pulmonary effort is normal.      Breath sounds: Normal breath sounds. No wheezing. No rhonchi. No rales.   Chest:      Chest wall: Not tender to palpatation.   Cardiovascular:      Normal rate. Regular rhythm. Normal S1. Normal S2.       Murmurs: There is no murmur.      No gallop.  No click. No rub.   Edema:     Peripheral edema absent.   Abdominal:      Palpations: Abdomen is soft.      Tenderness: There is no abdominal tenderness.   Musculoskeletal: Normal range of motion.         General: No tenderness. Skin:     General: Skin is warm and dry.   Neurological:      General: No focal deficit present.      Mental Status: Alert and oriented to person, place and time.       Lab Review:   Lab Results   Component Value Date     01/17/2024    K 4.9 01/17/2024     01/17/2024    CO2 28 01/17/2024    BUN 21 01/17/2024    CREATININE 1.08 (H) 01/17/2024    GLUCOSE 90 01/17/2024    CALCIUM 9.9 01/17/2024     Lab  Results   Component Value Date    WBC 8.8 07/10/2023    HGB 13.9 07/10/2023    HCT 43.4 07/10/2023    MCV 91 07/10/2023     07/10/2023     Lab Results   Component Value Date    CHOL 157 01/17/2024    TRIG 95 01/17/2024    HDL 58.8 01/17/2024     ECG obtained and reviewed, shows sinus bradycardia with a criteria for LVH and a ventricular rate of 59 bpm.      Assessment/Plan   Mrs. Crump is a pleasant 74-year-old female with a past medical history significant for nonischemic cardiomyopathy with angiographically normal coronary arteries by catheterization 2012, improved ejection fraction with most recent echo 6/2022 showing an LVEF of 60% and hypertension. She presents relatively stable from a cardiac standpoint except for some fatigue.  Vital signs and ECG are stable.  She does have some fatigue.  She is due for labs which I asked her to obtain in the next several days.  I will refer her to sleep medicine for reevaluation of her sleep apnea.  Lastly I will call her with the results of her echocardiogram.  I would like her to continue her current medication unchanged.  I encouraged her to continue to exercise as tolerated.  She will follow-up in 6 months and certainly sooner for any interim concerns.

## 2024-09-10 LAB
EJECTION FRACTION APICAL 4 CHAMBER: 56
EJECTION FRACTION: 58 %
LEFT ATRIUM VOLUME AREA LENGTH INDEX BSA: 29.5 ML/M2
LEFT VENTRICLE INTERNAL DIMENSION DIASTOLE: 4.7 CM (ref 3.5–6)
LEFT VENTRICULAR OUTFLOW TRACT DIAMETER: 2.19 CM
MITRAL VALVE E/A RATIO: 0.44
RIGHT VENTRICLE FREE WALL PEAK S': 8 CM/S
RIGHT VENTRICLE PEAK SYSTOLIC PRESSURE: 22.8 MMHG
TRICUSPID ANNULAR PLANE SYSTOLIC EXCURSION: 1.8 CM

## 2024-09-11 ENCOUNTER — TELEPHONE (OUTPATIENT)
Dept: CARDIOLOGY | Facility: CLINIC | Age: 74
End: 2024-09-11
Payer: MEDICARE

## 2024-09-30 NOTE — PROGRESS NOTES
Physical Therapy    Discharge Summary    Name: Constance Crump  MRN: [id number]  : 1950  Date: 24    Discharge Summary: PT    Discharge Information: Date of discharge 24, Date of last visit 24, Date of evaluation 24, and Number of attended visits 3    Therapy Summary: see last progress note 24    Discharge Status: unknown     Rehab Discharge Reason: Failed to schedule and/or keep follow-up appointment(s)

## 2025-02-11 ENCOUNTER — HOSPITAL ENCOUNTER (OUTPATIENT)
Dept: RADIOLOGY | Facility: CLINIC | Age: 75
Discharge: HOME | End: 2025-02-11
Payer: MEDICARE

## 2025-02-11 DIAGNOSIS — Z12.31 ENCOUNTER FOR SCREENING MAMMOGRAM FOR MALIGNANT NEOPLASM OF BREAST: ICD-10-CM

## 2025-02-11 DIAGNOSIS — Z00.00 HEALTHCARE MAINTENANCE: ICD-10-CM

## 2025-02-11 PROCEDURE — 77063 BREAST TOMOSYNTHESIS BI: CPT | Performed by: RADIOLOGY

## 2025-02-11 PROCEDURE — 77067 SCR MAMMO BI INCL CAD: CPT

## 2025-02-11 PROCEDURE — 77067 SCR MAMMO BI INCL CAD: CPT | Performed by: RADIOLOGY

## 2025-02-18 ENCOUNTER — APPOINTMENT (OUTPATIENT)
Dept: ORTHOPEDIC SURGERY | Facility: CLINIC | Age: 75
End: 2025-02-18
Payer: MEDICARE

## 2025-02-18 ENCOUNTER — HOSPITAL ENCOUNTER (OUTPATIENT)
Dept: RADIOLOGY | Facility: CLINIC | Age: 75
Discharge: HOME | End: 2025-02-18
Payer: MEDICARE

## 2025-02-18 ENCOUNTER — HOSPITAL ENCOUNTER (OUTPATIENT)
Dept: RADIOLOGY | Facility: EXTERNAL LOCATION | Age: 75
Discharge: HOME | End: 2025-02-18

## 2025-02-18 DIAGNOSIS — M17.0 PRIMARY OSTEOARTHRITIS OF BOTH KNEES: ICD-10-CM

## 2025-02-18 PROCEDURE — 73564 X-RAY EXAM KNEE 4 OR MORE: CPT | Mod: 50

## 2025-02-18 PROCEDURE — 1036F TOBACCO NON-USER: CPT | Performed by: FAMILY MEDICINE

## 2025-02-18 PROCEDURE — 1159F MED LIST DOCD IN RCRD: CPT | Performed by: FAMILY MEDICINE

## 2025-02-18 PROCEDURE — 20611 DRAIN/INJ JOINT/BURSA W/US: CPT | Performed by: FAMILY MEDICINE

## 2025-02-18 PROCEDURE — 99214 OFFICE O/P EST MOD 30 MIN: CPT | Performed by: FAMILY MEDICINE

## 2025-02-18 PROCEDURE — 1123F ACP DISCUSS/DSCN MKR DOCD: CPT | Performed by: FAMILY MEDICINE

## 2025-02-18 PROCEDURE — 1157F ADVNC CARE PLAN IN RCRD: CPT | Performed by: FAMILY MEDICINE

## 2025-02-18 RX ORDER — TRIAMCINOLONE ACETONIDE 40 MG/ML
40 INJECTION, SUSPENSION INTRA-ARTICULAR; INTRAMUSCULAR
Status: COMPLETED | OUTPATIENT
Start: 2025-02-18 | End: 2025-02-18

## 2025-02-18 RX ORDER — LIDOCAINE HYDROCHLORIDE 10 MG/ML
4 INJECTION, SOLUTION INFILTRATION; PERINEURAL
Status: COMPLETED | OUTPATIENT
Start: 2025-02-18 | End: 2025-02-18

## 2025-02-18 RX ADMIN — TRIAMCINOLONE ACETONIDE 40 MG: 40 INJECTION, SUSPENSION INTRA-ARTICULAR; INTRAMUSCULAR at 13:51

## 2025-02-18 RX ADMIN — LIDOCAINE HYDROCHLORIDE 4 ML: 10 INJECTION, SOLUTION INFILTRATION; PERINEURAL at 13:51

## 2025-02-18 NOTE — PROGRESS NOTES
** Please excuse any errors in grammar or translation related to this dictation. Voice recognition software was utilized to prepare this document. **    Assessment & Plan:  Patient here today for ongoing management of bilateral severe knee arthritis with exacerbation of condition. Discuss with patient the nature of this disease and how it can be progressive.  Discuss how symptoms can wax and wane in severity. Management options reviewed below.  - Maintaining a healthy weight: Every pound of bodyweight is about 4-5 pounds through the lower extremity.  - Exercise program to improve quad & hamstring strength to support joint.  - Activity modifications as needed to include use of cane/walker or braces.  - Analgesics to include but not limited to Tylenol up to 3g daily; topical diclofenac gel (Voltaren) up to 4x/daily.   - Steroid injection. Performed U/S guided bilateral knee aspiration and corticosteroid injection.  - Hyaluronic acid injection.  - Referral to arthroplastic surgeon for potential joint replacement. Due to severe osteoarthritis and given patient's interest, refer to Dr. Khan for joint replacement consultation.     Informed patient that steroid injection can be repeated every 3 or more months as symptoms dictate.  Follow-up as needed for ongoing management. All questions answered and patient is agreeable to this plan.       Chief complaint:  Bilateral knee pain    HPI:  2/18/25: Returns for bilateral knee pain due to osteoarthritis. Patient reports the last injections from 1/23/2024 provided about 3 months. Pain progressively increasing, particularly over past 2-3 months.   Denies any new injuries or trauma. She has been taking tumeric, tylenol 1000mg twice a day and voltaren gel once a week. She notes likely due to cold weather that exacerbated her bilateral knee pain. Inquires about knee replacement surgery.    1/23/24: 72 y/o patient, hx of HTN and bilateral knee OA, presents with bilateral knee  pain.  This complaint has been ongoing for several years.  No mechanism of injury reported at onset. Symptoms have progressively worsened with time.  Pain is most prominent anteriorly.  Most recently has been under the care of Dr. Neri, last having a steroid injection completed in May 2023 which helped for 3 to 4 months.  She also notes she is started using turmeric daily which also has helped mitigate her pain.  Previously was prescribed meloxicam 15 mg which she had used daily and help with her pain.  The most recent refill was placed this was only 7.5 mg and was last helpful in her pain symptoms.  She also mentions developing lower back pain over the last several months.  Denies radicular symptoms.    Patient Active Problem List   Diagnosis    Abdominal discomfort    Abnormal chest xray    Abnormal blood chemistry    Abnormal liver enzymes    Abnormal TSH    Acid reflux    Aortic valve disorder    Atypical chest pain    Benign mole    Bilateral knee pain    Cardiomyopathy    Change in hearing    Cough    Snoring    DJD (degenerative joint disease), lumbar    Hyperlipidemia    Fatigue    Fibroids    Foot pain    Heart palpitations    Hoarseness of voice    Hot flashes    Hyperglycemia    Hypersomnia    Benign hypertension    Left leg swelling    Leg cramps    LVH (left ventricular hypertrophy)    Morbid obesity (Multi)    Muscle spasm    Obstructive sleep apnea of adult    Osteoarthritis of right knee    Pituitary tumor    Postmenopausal bleeding    Postmenopausal disorder    Right calf pain    Greater trochanteric pain syndrome of right lower extremity    It band syndrome, right    Right low back pain    Primary osteoarthritis of both knees    Right hip pain    Skin lesion    Skin mole    Tendinopathy of right gluteus medius    Vision changes    Vitamin D deficiency    Need for hepatitis C screening test    Dermatosis papulosa nigra    Other seborrheic keratosis    Other viral warts    Benign neoplasm of  pituitary gland and craniopharyngeal duct (pouch) (Multi)    Posterior tibial tendon dysfunction (PTTD) of left lower extremity    Medicare annual wellness visit, subsequent    Difficulty in walking involving joint of multiple sites    Trochanteric bursitis of right hip     Past Surgical History:   Procedure Laterality Date    CHOLECYSTECTOMY  09/29/2014    Cholecystectomy    MOUTH SURGERY  09/11/2018    Oral Surgery Tooth Extraction    OTHER SURGICAL HISTORY  02/11/2016    Transsphenoidal Tumor Removal    OTHER SURGICAL HISTORY  04/22/2015    Thyroid Surgery Sub-Total Thyroidectomy     Current Outpatient Medications on File Prior to Visit   Medication Sig Dispense Refill    acetaminophen (Tylenol Arthritis Pain) 650 mg ER tablet Take by mouth.      amLODIPine (Norvasc) 5 mg tablet Take 1 tablet (5 mg) by mouth once daily. 90 tablet 1    aspirin 81 mg chewable tablet Chew 1 tablet (81 mg) once daily.      cholecalciferol (Vitamin D-3) 50 mcg (2,000 unit) capsule Take 1 capsule (50 mcg) by mouth early in the morning.. 90 capsule 3    lisinopril 40 mg tablet Take 1 tablet (40 mg) by mouth once daily. 90 tablet 1    metoprolol succinate XL (Toprol-XL) 100 mg 24 hr tablet Take 1 tablet (100 mg) by mouth once daily. 90 tablet 3    multivitamin with folic acid (One Daily Multivitamin) 400 mcg tablet Take 1 tablet by mouth once daily.      rosuvastatin (Crestor) 5 mg tablet TAKE ONE TABLET BY MOUTH AT BEDTIME EVERY MONDAY, WEDNESDAY, AND FRIDAY 36 tablet 3    triamcinolone acetonide (Zilretta) 32 mg injection Inject into the joint.      triamcinolone acetonide (Zilretta) 32 mg injection Inject 5mL (32mg) into each knee one time. 10 mL 0     No current facility-administered medications on file prior to visit.       Exam:  General Exam:  Constitutional - NAD, AAO x 3, conversing appropriately.  HEENT- Normocephalic and atraumatic. No facial deformities. Hearing grossly normal.  Lungs - Breathing non-labored with normal rate.  No accessory muscle use.  CV - Extremities warm and well-perfused, brisk capillary refill present.   Neuro - CN II-XII grossly intact.    Bilateral knee examined. No effusion, ecchymosis, or erythema.  AROM from 5 to 120 deg with 5/5 strength. SILT overlying knee. Retropatellar crepitus present.  Medial and lateral joint line tenderness, more pronounced on right.  No popliteal mass palpated. Negative anterior and posterior drawer.  No laxity to varus or valgus stress at 0 or 30 deg.  No patellar apprehension.    Results:  Xrays of bilateral knee obtained 2/18/25 personally interpreted as advanced tricompartmental degenerative changes with joint space narrowing, osteophyte formation, and subchondral sclerosis. Valgus alignment.     Lab Results   Component Value Date    HGBA1C 5.5 07/10/2023    CREATININE 1.08 (H) 01/17/2024    EGFR 54 (L) 01/17/2024       Procedure:  Patient ID: Constance Crump is a 74 y.o. female.    L Inj/Asp: bilateral knee on 2/18/2025 1:51 PM  Indications: pain and joint swelling  Details: 18 G needle, ultrasound-guided superolateral approach  Medications (Right): 40 mg triamcinolone acetonide 40 mg/mL; 4 mL lidocaine 10 mg/mL (1 %)  Aspirate (Right): 45 mL yellow  Medications (Left): 40 mg triamcinolone acetonide 40 mg/mL; 4 mL lidocaine 10 mg/mL (1 %)  Aspirate (Left): 32 mL blood-tinged  Outcome: tolerated well, no immediate complications    Procedure risk factors to include increased pain, bleeding, infection, neurovascular injury, soft tissue injury, progressive cartilage loss, transient elevation of blood glucose and blood pressure, and adverse reaction to medication were discussed with the patient. Patient understands there is a moderate risk of morbidity from undergoing the procedure.  Procedure, treatment alternatives, risks and benefits explained, specific risks discussed. Consent was given by the patient. Immediately prior to procedure a time out was called to verify the correct  patient, procedure, equipment, support staff and site/side marked as required. Patient was prepped and draped in the usual sterile fashion.

## 2025-03-04 ENCOUNTER — APPOINTMENT (OUTPATIENT)
Dept: ORTHOPEDIC SURGERY | Facility: CLINIC | Age: 75
End: 2025-03-04
Payer: MEDICARE

## 2025-03-04 ENCOUNTER — HOSPITAL ENCOUNTER (OUTPATIENT)
Dept: RADIOLOGY | Facility: CLINIC | Age: 75
Discharge: HOME | End: 2025-03-04
Payer: MEDICARE

## 2025-03-04 ENCOUNTER — HOSPITAL ENCOUNTER (OUTPATIENT)
Dept: RADIOLOGY | Facility: EXTERNAL LOCATION | Age: 75
Discharge: HOME | End: 2025-03-04

## 2025-03-04 DIAGNOSIS — M25.551 RIGHT HIP PAIN: ICD-10-CM

## 2025-03-04 DIAGNOSIS — M67.951 TENDINOPATHY OF RIGHT GLUTEUS MEDIUS: Primary | ICD-10-CM

## 2025-03-04 PROCEDURE — 20611 DRAIN/INJ JOINT/BURSA W/US: CPT | Performed by: FAMILY MEDICINE

## 2025-03-04 PROCEDURE — 1157F ADVNC CARE PLAN IN RCRD: CPT | Performed by: FAMILY MEDICINE

## 2025-03-04 PROCEDURE — 73502 X-RAY EXAM HIP UNI 2-3 VIEWS: CPT | Mod: RT

## 2025-03-04 PROCEDURE — 73502 X-RAY EXAM HIP UNI 2-3 VIEWS: CPT | Mod: RIGHT SIDE | Performed by: RADIOLOGY

## 2025-03-04 PROCEDURE — 99214 OFFICE O/P EST MOD 30 MIN: CPT | Performed by: FAMILY MEDICINE

## 2025-03-04 PROCEDURE — 1123F ACP DISCUSS/DSCN MKR DOCD: CPT | Performed by: FAMILY MEDICINE

## 2025-03-04 RX ORDER — TRIAMCINOLONE ACETONIDE 40 MG/ML
40 INJECTION, SUSPENSION INTRA-ARTICULAR; INTRAMUSCULAR
Status: COMPLETED | OUTPATIENT
Start: 2025-03-04 | End: 2025-03-04

## 2025-03-04 RX ORDER — LIDOCAINE HYDROCHLORIDE 10 MG/ML
2 INJECTION, SOLUTION INFILTRATION; PERINEURAL
Status: COMPLETED | OUTPATIENT
Start: 2025-03-04 | End: 2025-03-04

## 2025-03-04 RX ADMIN — TRIAMCINOLONE ACETONIDE 40 MG: 40 INJECTION, SUSPENSION INTRA-ARTICULAR; INTRAMUSCULAR at 14:14

## 2025-03-04 RX ADMIN — LIDOCAINE HYDROCHLORIDE 2 ML: 10 INJECTION, SOLUTION INFILTRATION; PERINEURAL at 14:14

## 2025-03-04 NOTE — PROGRESS NOTES
** Please excuse any errors in grammar or translation related to this dictation. Voice recognition software was utilized to prepare this document. **    Assessment & Plan:  Clinical presentation most consistent with gluteal tendinopathy leading to greater trochanteric pain syndrome. Discuss with patient that the underlying issue with this problem is weakened hip abductors causing traction on the trochanteric insertion. This traction causes friction leading to pain and inflammation. To address this problem from both treatment and prevention of recurrence, need to strengthen these muscles and improve mobility. Home exercises for hip abduction and extension reviewed with patient and advised to complete daily. For the pain, as patient has not responded to oral analgesics, was offered to complete steroid injection today. She had good relief from prior glute tendon sheath corticosteroid injection. Patient was agreeable to have completed, see below.  Return precautions given. F/u as needed for recurrence of symptoms or if they do not resolve after 5-6 weeks of the rehab exercises. We discussed other options for treatment to include PRP injection and referral for percutaneous tenotomy (Tenex).    Chief complaint:  Right hip pain    HPI:  75 y/o female, hx of HTN and bilateral knee OA presents with right lateral hip pain.  This complaint has been ongoing for 3+ years. No mechanism of injury reported at onset. Symptoms have progressively worsened with time.  Pain is most prominent at lateral hip/glute. Symptoms are aggravated by getting out of bed, prolonged standing. Treatment to date includes corticosteroid injection x2, voltaren, tylenol, physical therapy. Previously saw Dr. Neri for this with last visit being 1/8/2024. Last steroid injection to glute tendon sheath completed 1/8/2024 with good relief. Currently symptom control by tylenol. Denies previous surgery to this site.     Patient Active Problem List    Diagnosis    Abdominal discomfort    Abnormal chest xray    Abnormal blood chemistry    Abnormal liver enzymes    Abnormal TSH    Acid reflux    Aortic valve disorder    Atypical chest pain    Benign mole    Bilateral knee pain    Cardiomyopathy    Change in hearing    Cough    Snoring    DJD (degenerative joint disease), lumbar    Hyperlipidemia    Fatigue    Fibroids    Foot pain    Heart palpitations    Hoarseness of voice    Hot flashes    Hyperglycemia    Hypersomnia    Benign hypertension    Left leg swelling    Leg cramps    LVH (left ventricular hypertrophy)    Morbid obesity (Multi)    Muscle spasm    Obstructive sleep apnea of adult    Osteoarthritis of right knee    Pituitary tumor    Postmenopausal bleeding    Postmenopausal disorder    Right calf pain    Greater trochanteric pain syndrome of right lower extremity    It band syndrome, right    Right low back pain    Primary osteoarthritis of both knees    Right hip pain    Skin lesion    Skin mole    Tendinopathy of right gluteus medius    Vision changes    Vitamin D deficiency    Need for hepatitis C screening test    Dermatosis papulosa nigra    Other seborrheic keratosis    Other viral warts    Benign neoplasm of pituitary gland and craniopharyngeal duct (pouch) (Multi)    Posterior tibial tendon dysfunction (PTTD) of left lower extremity    Medicare annual wellness visit, subsequent    Difficulty in walking involving joint of multiple sites    Trochanteric bursitis of right hip     Past Surgical History:   Procedure Laterality Date    CHOLECYSTECTOMY  09/29/2014    Cholecystectomy    MOUTH SURGERY  09/11/2018    Oral Surgery Tooth Extraction    OTHER SURGICAL HISTORY  02/11/2016    Transsphenoidal Tumor Removal    OTHER SURGICAL HISTORY  04/22/2015    Thyroid Surgery Sub-Total Thyroidectomy     Current Outpatient Medications on File Prior to Visit   Medication Sig Dispense Refill    acetaminophen (Tylenol Arthritis Pain) 650 mg ER tablet Take by  mouth.      amLODIPine (Norvasc) 5 mg tablet Take 1 tablet (5 mg) by mouth once daily. 90 tablet 1    aspirin 81 mg chewable tablet Chew 1 tablet (81 mg) once daily.      cholecalciferol (Vitamin D-3) 50 mcg (2,000 unit) capsule Take 1 capsule (50 mcg) by mouth early in the morning.. 90 capsule 3    lisinopril 40 mg tablet Take 1 tablet (40 mg) by mouth once daily. 90 tablet 1    metoprolol succinate XL (Toprol-XL) 100 mg 24 hr tablet Take 1 tablet (100 mg) by mouth once daily. 90 tablet 3    multivitamin with folic acid (One Daily Multivitamin) 400 mcg tablet Take 1 tablet by mouth once daily.      rosuvastatin (Crestor) 5 mg tablet TAKE ONE TABLET BY MOUTH AT BEDTIME EVERY MONDAY, WEDNESDAY, AND FRIDAY 36 tablet 3    triamcinolone acetonide (Zilretta) 32 mg injection Inject into the joint.      triamcinolone acetonide (Zilretta) 32 mg injection Inject 5mL (32mg) into each knee one time. 10 mL 0     No current facility-administered medications on file prior to visit.     Exam:  General Exam:  Constitutional - NAD, AAO x 3, conversing appropriately.  HEENT- Normocephalic and atraumatic. No facial deformities. Hearing grossly normal.  Lungs - Breathing non-labored with normal rate. No accessory muscle use.  CV - Extremities warm and well-perfused, brisk capillary refill present.   Neuro - CN II-XII grossly intact.    R Hip Exam:  Normal gait  No warmth, erythema or ecchymosis overlying.  Active flexion >90 degrees.  IR 30 deg, ER 50 deg.  TTP over greater trochanter, glute tendons, proximal ITB  NTTP lumbar spine, SI joint, groin  5/5 strength of hip flexion, abduction, & adduction  SILT  [ - ]Log roll pain, [ - ]FADIR, [ + ]KP, [ - ]Stinchfield,  [ - ]Scour  [ - ] C-sign      Results:  Xrays of bilateral hip with pelvis obtained 3/4/2025 personally interpreted as mild degenerative changes with subchondral sclerosis. Noted calcification bilateral greater trochanter.    Lab Results   Component Value Date    HGBA1C  5.5 07/10/2023    CREATININE 1.08 (H) 01/17/2024    EGFR 54 (L) 01/17/2024       Procedure:  Patient ID: Constance Crump is a 74 y.o. female.    L Inj/Asp: R greater trochanteric bursa on 3/4/2025 2:14 PM  Indications: pain  Details: 22 G (spinal) needle, ultrasound-guided lateral approach  Medications: 2 mL lidocaine 10 mg/mL (1 %); 40 mg triamcinolone acetonide 40 mg/mL  Outcome: tolerated well, no immediate complications    Procedure risk factors to include increased pain, bleeding, infection, neurovascular injury, soft tissue injury, progressive cartilage loss, transient elevation of blood glucose and blood pressure, and adverse reaction to medication were discussed with the patient. Patient understands there is a moderate risk of morbidity from undergoing the procedure.  Procedure, treatment alternatives, risks and benefits explained, specific risks discussed. Consent was given by the patient. Immediately prior to procedure a time out was called to verify the correct patient, procedure, equipment, support staff and site/side marked as required. Patient was prepped and draped in the usual sterile fashion.

## 2025-03-10 ENCOUNTER — OFFICE VISIT (OUTPATIENT)
Dept: CARDIOLOGY | Facility: CLINIC | Age: 75
End: 2025-03-10
Payer: MEDICARE

## 2025-03-10 VITALS
HEART RATE: 92 BPM | SYSTOLIC BLOOD PRESSURE: 119 MMHG | OXYGEN SATURATION: 100 % | HEIGHT: 68 IN | WEIGHT: 203.7 LBS | BODY MASS INDEX: 30.87 KG/M2 | DIASTOLIC BLOOD PRESSURE: 81 MMHG

## 2025-03-10 DIAGNOSIS — I42.9 CARDIOMYOPATHY, UNSPECIFIED TYPE (MULTI): Primary | ICD-10-CM

## 2025-03-10 DIAGNOSIS — E78.5 DYSLIPIDEMIA: ICD-10-CM

## 2025-03-10 DIAGNOSIS — I10 BENIGN HYPERTENSION: ICD-10-CM

## 2025-03-10 PROBLEM — Z86.39 HISTORY OF THYROID DISORDER: Status: ACTIVE | Noted: 2025-03-10

## 2025-03-10 PROBLEM — D35.2 PITUITARY ADENOMA (MULTI): Status: ACTIVE | Noted: 2025-03-10

## 2025-03-10 PROCEDURE — 3074F SYST BP LT 130 MM HG: CPT | Performed by: NURSE PRACTITIONER

## 2025-03-10 PROCEDURE — 3079F DIAST BP 80-89 MM HG: CPT | Performed by: NURSE PRACTITIONER

## 2025-03-10 PROCEDURE — 1125F AMNT PAIN NOTED PAIN PRSNT: CPT | Performed by: NURSE PRACTITIONER

## 2025-03-10 PROCEDURE — 1157F ADVNC CARE PLAN IN RCRD: CPT | Performed by: NURSE PRACTITIONER

## 2025-03-10 PROCEDURE — 1160F RVW MEDS BY RX/DR IN RCRD: CPT | Performed by: NURSE PRACTITIONER

## 2025-03-10 PROCEDURE — 1159F MED LIST DOCD IN RCRD: CPT | Performed by: NURSE PRACTITIONER

## 2025-03-10 PROCEDURE — 1123F ACP DISCUSS/DSCN MKR DOCD: CPT | Performed by: NURSE PRACTITIONER

## 2025-03-10 PROCEDURE — 99213 OFFICE O/P EST LOW 20 MIN: CPT | Performed by: NURSE PRACTITIONER

## 2025-03-10 PROCEDURE — 93010 ELECTROCARDIOGRAM REPORT: CPT | Performed by: INTERNAL MEDICINE

## 2025-03-10 PROCEDURE — 93005 ELECTROCARDIOGRAM TRACING: CPT | Performed by: NURSE PRACTITIONER

## 2025-03-10 PROCEDURE — 3008F BODY MASS INDEX DOCD: CPT | Performed by: NURSE PRACTITIONER

## 2025-03-10 ASSESSMENT — COLUMBIA-SUICIDE SEVERITY RATING SCALE - C-SSRS
6. HAVE YOU EVER DONE ANYTHING, STARTED TO DO ANYTHING, OR PREPARED TO DO ANYTHING TO END YOUR LIFE?: NO
1. IN THE PAST MONTH, HAVE YOU WISHED YOU WERE DEAD OR WISHED YOU COULD GO TO SLEEP AND NOT WAKE UP?: NO
2. HAVE YOU ACTUALLY HAD ANY THOUGHTS OF KILLING YOURSELF?: NO

## 2025-03-10 ASSESSMENT — ENCOUNTER SYMPTOMS
CARDIOVASCULAR NEGATIVE: 1
OCCASIONAL FEELINGS OF UNSTEADINESS: 0
RESPIRATORY NEGATIVE: 1

## 2025-03-10 ASSESSMENT — PATIENT HEALTH QUESTIONNAIRE - PHQ9
1. LITTLE INTEREST OR PLEASURE IN DOING THINGS: NOT AT ALL
SUM OF ALL RESPONSES TO PHQ9 QUESTIONS 1 AND 2: 0
2. FEELING DOWN, DEPRESSED OR HOPELESS: NOT AT ALL

## 2025-03-10 ASSESSMENT — PAIN SCALES - GENERAL: PAINLEVEL_OUTOF10: 3

## 2025-03-10 NOTE — PROGRESS NOTES
Subjective   Constance Crump is a 74 y.o. female.    Chief Complaint:  No chief complaint on file.    HPI  Mrs. Crump is seen for a 6-month follow-up.  She is seen in collaboration with Dr. Veloz.  She denies any recent hospitalizations or ED visits.  She has been seeing orthopedics for knee arthritis.  This is somewhat limiting to her and she is considering knee surgery although just had injections which are somewhat helpful.  She denies any symptoms referable to angina or heart failure.  She is compliant with all medication and appears to tolerate them well.  She has no cardiac concerns today.  She does not need prescription refills.    Review of Systems   Cardiovascular: Negative.    Respiratory: Negative.     Musculoskeletal:  Positive for arthritis.   All other systems reviewed and are negative.      Objective   Vitals reviewed.   Constitutional:       Appearance: Healthy appearance. Not in distress.   Neck:      Vascular: No JVR. JVD normal.   Pulmonary:      Effort: Pulmonary effort is normal.      Breath sounds: Normal breath sounds. No wheezing. No rhonchi. No rales.   Chest:      Chest wall: Not tender to palpatation.   Cardiovascular:      Normal rate. Regular rhythm. Normal S1. Normal S2.       Murmurs: There is no murmur.      No gallop.  No click. No rub.   Pulses:     Intact distal pulses.   Edema:     Peripheral edema absent.   Abdominal:      Tenderness: There is no abdominal tenderness.   Musculoskeletal: Normal range of motion.         General: No tenderness. Skin:     General: Skin is warm and dry.   Neurological:      General: No focal deficit present.      Mental Status: Alert and oriented to person, place and time.         Lab Review:   Lab Results   Component Value Date     01/17/2024    K 4.9 01/17/2024     01/17/2024    CO2 28 01/17/2024    BUN 21 01/17/2024    CREATININE 1.08 (H) 01/17/2024    GLUCOSE 90 01/17/2024    CALCIUM 9.9 01/17/2024     Lab Results   Component  Value Date    WBC 8.8 07/10/2023    HGB 13.9 07/10/2023    HCT 43.4 07/10/2023    MCV 91 07/10/2023     07/10/2023     Lab Results   Component Value Date    CHOL 157 01/17/2024    TRIG 95 01/17/2024    HDL 58.8 01/17/2024     ECG obtained and reviewed shows normal sinus rhythm with NSST abnormality and a VR of 85 bpm    Assessment/Plan   The encounter diagnosis was Cardiomyopathy, unspecified type (Multi).  Mrs. Crump is a pleasant 74-year-old female with a past medical history significant for nonischemic cardiomyopathy with angiographically normal coronary arteries by catheterization 2012, improved ejection fraction with most recent echo 9/2024 showing an LVEF of 60% and hypertension. She presents stable from a cardiac standpoint without symtpoms referable to angina.  Vital signs and ECG are stable.  She does have knee arthritis which limits her activities. She is due for labs which I asked her to obtain in the next several days.  I will refer her to sleep medicine again for reevaluation of her sleep apnea.  She does not need any cardiac testing at this time however she will let us know when she decides on orhto surgery as we may recommend stress testing at that time.  Mr. Crump will call in the interim with any concern or question and follow up in another 6 months.

## 2025-03-11 LAB
ATRIAL RATE: 85 BPM
P AXIS: 35 DEGREES
P OFFSET: 200 MS
P ONSET: 148 MS
PR INTERVAL: 156 MS
Q ONSET: 226 MS
QRS COUNT: 14 BEATS
QRS DURATION: 88 MS
QT INTERVAL: 362 MS
QTC CALCULATION(BAZETT): 430 MS
QTC FREDERICIA: 406 MS
R AXIS: 66 DEGREES
T AXIS: 57 DEGREES
T OFFSET: 407 MS
VENTRICULAR RATE: 85 BPM

## 2025-03-13 DIAGNOSIS — I10 PRIMARY HYPERTENSION: Primary | ICD-10-CM

## 2025-03-13 RX ORDER — AMLODIPINE BESYLATE 5 MG/1
5 TABLET ORAL DAILY
Qty: 90 TABLET | Refills: 3 | Status: SHIPPED | OUTPATIENT
Start: 2025-03-13

## 2025-04-18 DIAGNOSIS — I10 PRIMARY HYPERTENSION: ICD-10-CM

## 2025-04-18 RX ORDER — LISINOPRIL 40 MG/1
40 TABLET ORAL DAILY
Qty: 90 TABLET | Refills: 3 | Status: SHIPPED | OUTPATIENT
Start: 2025-04-18

## 2025-06-10 ENCOUNTER — APPOINTMENT (OUTPATIENT)
Dept: ORTHOPEDIC SURGERY | Facility: CLINIC | Age: 75
End: 2025-06-10
Payer: MEDICARE

## 2025-06-10 ENCOUNTER — HOSPITAL ENCOUNTER (OUTPATIENT)
Dept: RADIOLOGY | Facility: EXTERNAL LOCATION | Age: 75
Discharge: HOME | End: 2025-06-10

## 2025-06-10 DIAGNOSIS — M17.0 PRIMARY OSTEOARTHRITIS OF BOTH KNEES: Primary | ICD-10-CM

## 2025-06-10 DIAGNOSIS — M25.462 EFFUSION OF BOTH KNEE JOINTS: ICD-10-CM

## 2025-06-10 DIAGNOSIS — M25.461 EFFUSION OF BOTH KNEE JOINTS: ICD-10-CM

## 2025-06-10 PROCEDURE — 99214 OFFICE O/P EST MOD 30 MIN: CPT | Performed by: FAMILY MEDICINE

## 2025-06-10 PROCEDURE — 20611 DRAIN/INJ JOINT/BURSA W/US: CPT | Performed by: FAMILY MEDICINE

## 2025-06-10 RX ORDER — TRIAMCINOLONE ACETONIDE 40 MG/ML
40 INJECTION, SUSPENSION INTRA-ARTICULAR; INTRAMUSCULAR
Status: COMPLETED | OUTPATIENT
Start: 2025-06-10 | End: 2025-06-10

## 2025-06-10 RX ORDER — LIDOCAINE HYDROCHLORIDE 10 MG/ML
4 INJECTION, SOLUTION INFILTRATION; PERINEURAL
Status: COMPLETED | OUTPATIENT
Start: 2025-06-10 | End: 2025-06-10

## 2025-06-10 RX ADMIN — TRIAMCINOLONE ACETONIDE 40 MG: 40 INJECTION, SUSPENSION INTRA-ARTICULAR; INTRAMUSCULAR at 11:45

## 2025-06-10 RX ADMIN — LIDOCAINE HYDROCHLORIDE 4 ML: 10 INJECTION, SOLUTION INFILTRATION; PERINEURAL at 11:45

## 2025-06-10 NOTE — PROGRESS NOTES
** Please excuse any errors in grammar or translation related to this dictation. Voice recognition software was utilized to prepare this document. **    Assessment & Plan:  Patient here today for ongoing management of bilateral severe knee arthritis with exacerbation of condition. CSI completed in February helped around 2 months with progressive increase in pain since then. We reviewed management options as below.  - Maintaining a healthy weight: Every pound of bodyweight is about 4-5 pounds through the lower extremity.  - Exercise program to improve quad & hamstring strength to support joint.  - Activity modifications as needed to include use of cane/walker or braces. Inquired about bracing.  Best option would be lateral  but could start with knee sleeve and basic hinge brace. If wants , will need fitting.  - Analgesics to include but not limited to Tylenol up to 3g daily; topical diclofenac gel (Voltaren) up to 4x/daily.   - Steroid injection. Performed U/S guided bilateral knee aspiration and corticosteroid injection.  - Patient also interested in arthroplasty consult. I provider her with contact information for scheduling.   - Patient given order for handicap parking placard.   Informed patient that steroid injection can be repeated every 3 or more months as symptoms dictate.  Follow-up as needed for ongoing management. All questions answered and patient is agreeable to this plan.       Chief complaint:  Bilateral knee pain    HPI:  6/10/25: Patient follows up for bilateral knee pain.  She reports the 2/18 injections provided 1 month of great pain relief and another month of moderate relief.  Pain progressively increase past 4-6 weeks. No interval injury.  Right more symptomatic.     2/18/25: Returns for bilateral knee pain due to osteoarthritis. Patient reports the last injections from 1/23/2024 provided about 3 months. Pain progressively increasing, particularly over past 2-3 months.   Denies  any new injuries or trauma. She has been taking tumeric, tylenol 1000mg twice a day and voltaren gel once a week. She notes likely due to cold weather that exacerbated her bilateral knee pain. Inquires about knee replacement surgery.    1/23/24: 74 y/o patient, hx of HTN and bilateral knee OA, presents with bilateral knee pain.  This complaint has been ongoing for several years.  No mechanism of injury reported at onset. Symptoms have progressively worsened with time.  Pain is most prominent anteriorly.  Most recently has been under the care of Dr. Neri, last having a steroid injection completed in May 2023 which helped for 3 to 4 months.  She also notes she is started using turmeric daily which also has helped mitigate her pain.  Previously was prescribed meloxicam 15 mg which she had used daily and help with her pain.  The most recent refill was placed this was only 7.5 mg and was last helpful in her pain symptoms.  She also mentions developing lower back pain over the last several months.  Denies radicular symptoms.    Patient Active Problem List   Diagnosis    Abdominal discomfort    Abnormal chest xray    Abnormal blood chemistry    Abnormal liver enzymes    Abnormal TSH    Acid reflux    Aortic valve disorder    Atypical chest pain    Benign mole    Bilateral knee pain    Cardiomyopathy    Change in hearing    Cough    Snoring    DJD (degenerative joint disease), lumbar    Hyperlipidemia    Fatigue    Fibroids    Foot pain    Heart palpitations    Hoarseness of voice    Hot flashes    Hyperglycemia    Hypersomnia    Benign hypertension    Left leg swelling    Leg cramps    LVH (left ventricular hypertrophy)    Morbid obesity (Multi)    Muscle spasm    Obstructive sleep apnea of adult    Osteoarthritis of right knee    Pituitary tumor    Postmenopausal bleeding    Postmenopausal disorder    Right calf pain    Greater trochanteric pain syndrome of right lower extremity    It band syndrome, right    Right  low back pain    Primary osteoarthritis of both knees    Right hip pain    Skin lesion    Skin mole    Tendinopathy of right gluteus medius    Vision changes    Vitamin D deficiency    Need for hepatitis C screening test    Dermatosis papulosa nigra    Other seborrheic keratosis    Other viral warts    Benign neoplasm of pituitary gland and craniopharyngeal duct (pouch) (Multi)    Posterior tibial tendon dysfunction (PTTD) of left lower extremity    Medicare annual wellness visit, subsequent    Difficulty in walking involving joint of multiple sites    Trochanteric bursitis of right hip    Dyslipidemia    History of thyroid disorder    Pituitary adenoma (Multi)     Past Surgical History:   Procedure Laterality Date    CHOLECYSTECTOMY  09/29/2014    Cholecystectomy    MOUTH SURGERY  09/11/2018    Oral Surgery Tooth Extraction    OTHER SURGICAL HISTORY  02/11/2016    Transsphenoidal Tumor Removal    OTHER SURGICAL HISTORY  04/22/2015    Thyroid Surgery Sub-Total Thyroidectomy     Current Outpatient Medications on File Prior to Visit   Medication Sig Dispense Refill    acetaminophen (Tylenol Arthritis Pain) 650 mg ER tablet Take by mouth.      amLODIPine (Norvasc) 5 mg tablet Take 1 tablet (5 mg) by mouth once daily. 90 tablet 3    aspirin 81 mg chewable tablet Chew 1 tablet (81 mg) once daily.      cholecalciferol (Vitamin D-3) 50 mcg (2,000 unit) capsule Take 1 capsule (50 mcg) by mouth early in the morning.. 90 capsule 3    lisinopril 40 mg tablet TAKE 1 TABLET BY MOUTH EVERY DAY 90 tablet 3    metoprolol succinate XL (Toprol-XL) 100 mg 24 hr tablet Take 1 tablet (100 mg) by mouth once daily. 90 tablet 3    multivitamin with folic acid (One Daily Multivitamin) 400 mcg tablet Take 1 tablet by mouth once daily.      rosuvastatin (Crestor) 5 mg tablet TAKE ONE TABLET BY MOUTH AT BEDTIME EVERY MONDAY, WEDNESDAY, AND FRIDAY 36 tablet 3     No current facility-administered medications on file prior to visit.        Exam:  General Exam:  Constitutional - NAD, AAO x 3, conversing appropriately.  HEENT- Normocephalic and atraumatic. No facial deformities. Hearing grossly normal.  Lungs - Breathing non-labored with normal rate. No accessory muscle use.  CV - Extremities warm and well-perfused, brisk capillary refill present.   Neuro - CN II-XII grossly intact.    Bilateral knee examined. No effusion, ecchymosis, or erythema.  AROM from 5 to 120 deg with 5/5 strength. SILT overlying knee. Retropatellar crepitus present.  Medial and lateral joint line tenderness, more pronounced on right.  No popliteal mass palpated. Negative anterior and posterior drawer.  Valgus deformity partially correct with varus stress.    Results:  Xrays of bilateral knee obtained 2/18/25 personally interpreted as advanced tricompartmental degenerative changes with joint space narrowing, osteophyte formation, and subchondral sclerosis. Valgus alignment.     Lab Results   Component Value Date    HGBA1C 5.5 07/10/2023    CREATININE 1.08 (H) 01/17/2024    EGFR 54 (L) 01/17/2024       Procedure:  Patient ID: Constance Crump is a 75 y.o. female.    L Inj/Asp: bilateral knee on 6/10/2025 11:45 AM  Indications: pain  Details: 18 G needle, ultrasound-guided superolateral approach  Medications (Right): 40 mg triamcinolone acetonide 40 mg/mL; 4 mL lidocaine 10 mg/mL (1 %)  Aspirate (Right): 29 mL serous and blood-tinged  Medications (Left): 40 mg triamcinolone acetonide 40 mg/mL; 4 mL lidocaine 10 mg/mL (1 %)  Aspirate (Left): 31 mL serous and blood-tinged  Outcome: tolerated well, no immediate complications    Procedure risk factors to include increased pain, bleeding, infection, neurovascular injury, soft tissue injury, progressive cartilage loss, transient elevation of blood glucose and blood pressure, and adverse reaction to medication were discussed with the patient. Patient understands there is a moderate risk of morbidity from undergoing the  procedure.    Procedure, treatment alternatives, risks and benefits explained, specific risks discussed. Consent was given by the patient. Immediately prior to procedure a time out was called to verify the correct patient, procedure, equipment, support staff and site/side marked as required. Patient was prepped and draped in the usual sterile fashion.

## 2025-07-01 ENCOUNTER — HOSPITAL ENCOUNTER (OUTPATIENT)
Dept: RADIOLOGY | Facility: EXTERNAL LOCATION | Age: 75
Discharge: HOME | End: 2025-07-01

## 2025-07-01 ENCOUNTER — APPOINTMENT (OUTPATIENT)
Dept: ORTHOPEDIC SURGERY | Facility: CLINIC | Age: 75
End: 2025-07-01
Payer: MEDICARE

## 2025-07-01 DIAGNOSIS — M25.551 RIGHT HIP PAIN: ICD-10-CM

## 2025-07-01 DIAGNOSIS — M67.951 TENDINOPATHY OF RIGHT GLUTEUS MEDIUS: ICD-10-CM

## 2025-07-01 DIAGNOSIS — M70.61 TROCHANTERIC BURSITIS OF RIGHT HIP: Primary | ICD-10-CM

## 2025-07-01 PROCEDURE — 1159F MED LIST DOCD IN RCRD: CPT | Performed by: FAMILY MEDICINE

## 2025-07-01 PROCEDURE — 1036F TOBACCO NON-USER: CPT | Performed by: FAMILY MEDICINE

## 2025-07-01 PROCEDURE — 20611 DRAIN/INJ JOINT/BURSA W/US: CPT | Performed by: FAMILY MEDICINE

## 2025-07-01 PROCEDURE — 99213 OFFICE O/P EST LOW 20 MIN: CPT | Performed by: FAMILY MEDICINE

## 2025-07-01 RX ORDER — TRIAMCINOLONE ACETONIDE 40 MG/ML
40 INJECTION, SUSPENSION INTRA-ARTICULAR; INTRAMUSCULAR
Status: COMPLETED | OUTPATIENT
Start: 2025-07-01 | End: 2025-07-01

## 2025-07-01 RX ORDER — LIDOCAINE HYDROCHLORIDE 10 MG/ML
2 INJECTION, SOLUTION INFILTRATION; PERINEURAL
Status: COMPLETED | OUTPATIENT
Start: 2025-07-01 | End: 2025-07-01

## 2025-07-01 RX ADMIN — LIDOCAINE HYDROCHLORIDE 2 ML: 10 INJECTION, SOLUTION INFILTRATION; PERINEURAL at 11:08

## 2025-07-01 RX ADMIN — TRIAMCINOLONE ACETONIDE 40 MG: 40 INJECTION, SUSPENSION INTRA-ARTICULAR; INTRAMUSCULAR at 11:08

## 2025-07-01 NOTE — PROGRESS NOTES
** Please excuse any errors in grammar or translation related to this dictation. Voice recognition software was utilized to prepare this document. **    Assessment & Plan:  Patient following up for right sided gluteal tendinopathy.  Findings remain consistent for his condition opposed to hip arthritis.  Again reassured patient that this is due to weakened hip abductor musculature.  To address this she has to be consistent with home exercise program which she has failed to do.  I again demonstrated exercises she can perform as well as providing a handout to the patient to utilize and encourage completing at least 3 times weekly.  Did offer to repeat ultrasound-guided trochanteric bursal injection as it does provide her symptomatic relief.  Return precautions given.  Follow-up as needed.    Chief complaint:  Right hip pain    HPI:  7/1/25:  Patient follows up for right hip pain.  She reports the injection from March provided around 3 months of pain relief.  Denies any new injuries. She has not been compliant with home exercises for hip abductors.     3/4/25: 75 y/o female, hx of HTN and bilateral knee OA presents with right lateral hip pain.  This complaint has been ongoing for 3+ years. No mechanism of injury reported at onset. Symptoms have progressively worsened with time.  Pain is most prominent at lateral hip/glute. Symptoms are aggravated by getting out of bed, prolonged standing. Treatment to date includes corticosteroid injection x2, voltaren, tylenol, physical therapy. Previously saw Dr. Neri for this with last visit being 1/8/2024. Last steroid injection to glute tendon sheath completed 1/8/2024 with good relief. Currently symptom control by tylenol. Denies previous surgery to this site.     Patient Active Problem List   Diagnosis    Abdominal discomfort    Abnormal chest xray    Abnormal blood chemistry    Abnormal liver enzymes    Abnormal TSH    Acid reflux    Aortic valve disorder    Atypical chest  pain    Benign mole    Bilateral knee pain    Cardiomyopathy    Change in hearing    Cough    Snoring    DJD (degenerative joint disease), lumbar    Hyperlipidemia    Fatigue    Fibroids    Foot pain    Heart palpitations    Hoarseness of voice    Hot flashes    Hyperglycemia    Hypersomnia    Benign hypertension    Left leg swelling    Leg cramps    LVH (left ventricular hypertrophy)    Morbid obesity (Multi)    Muscle spasm    Obstructive sleep apnea of adult    Osteoarthritis of right knee    Pituitary tumor    Postmenopausal bleeding    Postmenopausal disorder    Right calf pain    Greater trochanteric pain syndrome of right lower extremity    It band syndrome, right    Right low back pain    Primary osteoarthritis of both knees    Right hip pain    Skin lesion    Skin mole    Tendinopathy of right gluteus medius    Vision changes    Vitamin D deficiency    Need for hepatitis C screening test    Dermatosis papulosa nigra    Other seborrheic keratosis    Other viral warts    Benign neoplasm of pituitary gland and craniopharyngeal duct (pouch) (Multi)    Posterior tibial tendon dysfunction (PTTD) of left lower extremity    Medicare annual wellness visit, subsequent    Difficulty in walking involving joint of multiple sites    Trochanteric bursitis of right hip    Dyslipidemia    History of thyroid disorder    Pituitary adenoma (Multi)     Past Surgical History:   Procedure Laterality Date    CHOLECYSTECTOMY  09/29/2014    Cholecystectomy    MOUTH SURGERY  09/11/2018    Oral Surgery Tooth Extraction    OTHER SURGICAL HISTORY  02/11/2016    Transsphenoidal Tumor Removal    OTHER SURGICAL HISTORY  04/22/2015    Thyroid Surgery Sub-Total Thyroidectomy     Current Outpatient Medications on File Prior to Visit   Medication Sig Dispense Refill    acetaminophen (Tylenol Arthritis Pain) 650 mg ER tablet Take by mouth.      amLODIPine (Norvasc) 5 mg tablet Take 1 tablet (5 mg) by mouth once daily. 90 tablet 3    aspirin 81  mg chewable tablet Chew 1 tablet (81 mg) once daily.      lisinopril 40 mg tablet TAKE 1 TABLET BY MOUTH EVERY DAY 90 tablet 3    metoprolol succinate XL (Toprol-XL) 100 mg 24 hr tablet Take 1 tablet (100 mg) by mouth once daily. 90 tablet 3    multivitamin with folic acid (One Daily Multivitamin) 400 mcg tablet Take 1 tablet by mouth once daily.      rosuvastatin (Crestor) 5 mg tablet TAKE ONE TABLET BY MOUTH AT BEDTIME EVERY MONDAY, WEDNESDAY, AND FRIDAY 36 tablet 3     No current facility-administered medications on file prior to visit.     Exam:  General Exam:  Constitutional - NAD, AAO x 3, conversing appropriately.  HEENT- Normocephalic and atraumatic. No facial deformities. Hearing grossly normal.  Lungs - Breathing non-labored with normal rate. No accessory muscle use.  CV - Extremities warm and well-perfused, brisk capillary refill present.   Neuro - CN II-XII grossly intact.    R Hip Exam:  Normal gait  No warmth, erythema or ecchymosis overlying.  Active flexion >90 degrees.  IR 30 deg, ER 50 deg.  TTP over greater trochanter, glute tendons, proximal ITB  NTTP lumbar spine, SI joint, groin  5/5 strength of hip flexion, abduction, & adduction  SILT  [ - ]Log roll pain, [ - ]FADIR, [ + ]KP, [ - ]Stinchfield  Pain with resisted lateral recumbent abduction.      Results:  Xrays of bilateral hip with pelvis obtained 3/4/2025: mild degenerative changes with subchondral sclerosis. Noted calcification bilateral greater trochanter.    Lab Results   Component Value Date    HGBA1C 5.5 07/10/2023    CREATININE 1.08 (H) 01/17/2024    EGFR 54 (L) 01/17/2024       Procedure:  Patient ID: Constance Crump is a 75 y.o. female.    L Inj/Asp: R greater trochanteric bursa on 7/1/2025 11:08 AM  Indications: pain  Details: 25 G needle, ultrasound-guided lateral approach  Medications: 40 mg triamcinolone acetonide 40 mg/mL; 2 mL lidocaine 10 mg/mL (1 %)  Outcome: tolerated well, no immediate complications    Procedure risk  factors to include increased pain, bleeding, infection, neurovascular injury, soft tissue injury, fat atrophy, transient elevation of blood glucose and blood pressure, and adverse reaction to medication were discussed with the patient. Patient understands there is a moderate risk of morbidity from undergoing the procedure.    Procedure, treatment alternatives, risks and benefits explained, specific risks discussed. Consent was given by the patient. Immediately prior to procedure a time out was called to verify the correct patient, procedure, equipment, support staff and site/side marked as required. Patient was prepped and draped in the usual sterile fashion.

## 2025-07-18 DIAGNOSIS — I10 PRIMARY HYPERTENSION: ICD-10-CM

## 2025-07-21 RX ORDER — METOPROLOL SUCCINATE 100 MG/1
100 TABLET, EXTENDED RELEASE ORAL DAILY
Qty: 90 TABLET | Refills: 3 | Status: SHIPPED | OUTPATIENT
Start: 2025-07-21

## 2025-08-28 ENCOUNTER — APPOINTMENT (OUTPATIENT)
Dept: PRIMARY CARE | Facility: CLINIC | Age: 75
End: 2025-08-28
Payer: MEDICARE

## 2025-09-15 ENCOUNTER — APPOINTMENT (OUTPATIENT)
Dept: CARDIOLOGY | Facility: CLINIC | Age: 75
End: 2025-09-15
Payer: MEDICARE

## 2025-10-07 ENCOUNTER — APPOINTMENT (OUTPATIENT)
Dept: ORTHOPEDIC SURGERY | Facility: CLINIC | Age: 75
End: 2025-10-07
Payer: MEDICARE

## 2025-10-21 ENCOUNTER — APPOINTMENT (OUTPATIENT)
Dept: ORTHOPEDIC SURGERY | Facility: CLINIC | Age: 75
End: 2025-10-21
Payer: MEDICARE